# Patient Record
Sex: FEMALE | Race: WHITE | NOT HISPANIC OR LATINO | Employment: UNEMPLOYED | URBAN - NONMETROPOLITAN AREA
[De-identification: names, ages, dates, MRNs, and addresses within clinical notes are randomized per-mention and may not be internally consistent; named-entity substitution may affect disease eponyms.]

---

## 2016-02-02 LAB — HM PAP SMEAR: NORMAL

## 2017-03-16 ENCOUNTER — OFFICE VISIT (OUTPATIENT)
Dept: OBSTETRICS AND GYNECOLOGY | Facility: CLINIC | Age: 37
End: 2017-03-16

## 2017-03-16 VITALS
DIASTOLIC BLOOD PRESSURE: 74 MMHG | HEIGHT: 69 IN | WEIGHT: 207 LBS | BODY MASS INDEX: 30.66 KG/M2 | SYSTOLIC BLOOD PRESSURE: 128 MMHG

## 2017-03-16 DIAGNOSIS — N92.0 MENORRHAGIA WITH REGULAR CYCLE: ICD-10-CM

## 2017-03-16 DIAGNOSIS — N84.1 CERVICAL POLYP: ICD-10-CM

## 2017-03-16 DIAGNOSIS — Z01.411 ENCOUNTER FOR GYNECOLOGICAL EXAMINATION WITH ABNORMAL FINDING: Primary | ICD-10-CM

## 2017-03-16 PROCEDURE — 99395 PREV VISIT EST AGE 18-39: CPT | Performed by: OBSTETRICS & GYNECOLOGY

## 2017-03-16 PROCEDURE — 57500 BIOPSY OF CERVIX: CPT | Performed by: OBSTETRICS & GYNECOLOGY

## 2017-03-16 PROCEDURE — 17250 CHEM CAUT OF GRANLTJ TISSUE: CPT | Performed by: OBSTETRICS & GYNECOLOGY

## 2017-03-16 NOTE — PROGRESS NOTES
Subjective  Chief Complaint   Patient presents with   • Gynecologic Exam     Patient is 36 y.o.  here for annual exam.  Pt with complaints of worsening of menses over the last year.  Pt reports menses are regular but heavy and painful.  Pt reports lasting 6-7 days.  Pt reports change within the last year.  Pt initially had postcoital bleeding as well but not at present.  Pt using natural family planning for birth control.  Pt told many years ago could not use hormonal contraception secondary to seizures which were related from head trauma.  Pt previously saw Dr. Epps.  Pt does not have neurologist now.  Pt interested in other forms of birth control.    History  Past Medical History   Diagnosis Date   • Abnormal Pap smear of cervix      ATYPICAL, REPEAT NORMAL, NO FURTHER TREATMENT REQUIRED   • Broken arm    • Broken foot    • Headache      Migraines   • Kidney infection 2016   • Ovarian cyst    • Seizures      last seizure approx      Current Outpatient Prescriptions on File Prior to Visit   Medication Sig Dispense Refill   • [DISCONTINUED] levoFLOXacin (LEVAQUIN) 500 MG tablet        No current facility-administered medications on file prior to visit.      Allergies   Allergen Reactions   • Vancomycin      Past Surgical History   Procedure Laterality Date   • Bartholin gland cyst excision  2016   • Other surgical history       RIGHT FOOT BROKEN    • Other surgical history       RIGHT ARM BROKEN     Family History   Problem Relation Age of Onset   • Cancer Mother      skin   • Hypertension Father    • Hyperlipidemia Father    • Migraines Father    • Stroke Father      Social History     Social History   • Marital status:      Spouse name: N/A   • Number of children: N/A   • Years of education: N/A     Social History Main Topics   • Smoking status: Former Smoker     Types: Cigarettes   • Smokeless tobacco: Never Used   • Alcohol use Yes   • Drug use: No   • Sexual activity: Yes      "Partners: Male     Birth control/ protection: None     Other Topics Concern   • None     Social History Narrative     Review of Systems  The following systems were reviewed and negative;  constitution, eyes, ENT, respiratory, cardiovascular, gastrointestinal, genitourinary, integument, breast, hematologic / lymphatic, musculoskeletal, neurological, behavioral/psych, endocrine and allergies / immunologic    Objective  Vitals:    03/16/17 1101   BP: 128/74   Weight: 207 lb (93.9 kg)   Height: 69\" (175.3 cm)     Physical Exam:  General Appearance: alert, appears stated age and cooperative  Head: normocephalic, without obvious abnormality and atraumatic  Eyes: lids and lashes normal, conjunctivae and sclerae normal, no icterus, no pallor and corneas clear  Ears: ears appear intact with no abnormalities noted  Neck: suppple, trachea midline and no thyromegaly  Lungs: clear to auscultation, respirations regular, respirations even and respirations unlabored  Heart: regular rhythm and normal rate, normal S1, S2, no murmur, gallop, or rubs and no click  Breasts: Examined in supine position  Symmetric without masses or skin dimpling  Nipples normal without inversion, lesions or discharge  There are no palpable axillary nodes  Abdomen: normal bowel sounds, no masses, no hepatomegaly, no splenomegaly, soft non-tender, no guarding and no rebound tenderness  Pelvic: Clinical staff was present for exam  External genitalia:  normal appearance of the external genitalia including Bartholin's and Lemay's glands.  :  urethral meatus normal; urethral hypermobility is absent.  Vaginal:  normal pink mucosa without prolapse or lesions.  Cervix:  large approx 3-4 cm long polypoid mass posterior aspect of cervix 6 o'clock position; mass removed with polyp forcep; base cauterized with silver nitrate and monsels solution; tampon placed as well; pt tolerated proceedure well  Uterus:  normal size, shape and consistency.  Adnexa:  normal " bimanual exam of the adnexa.  Extremities: moves extremities well, no edema, no cyanosis and no redness  Skin: no bleeding, bruising or rash and no lesions noted  Psych: normal mood and affect, oriented to person, time and place, thought content organized and appropriate judgment    Lab Review   No data reviewed    Imaging   No data reviewed    Assessment/Plan    Problem List Items Addressed This Visit     None      Visit Diagnoses     Encounter for gynecological examination with abnormal finding    -  Primary  Pap done  Discussed contraceptive options; pt to see neurology and consider IUD; info given in pamphlet form    Menorrhagia with regular cycle        Cervical polyp      Polypectomy performed; instructions and precautions given; pt to call if cont menorrhagia and will schedule TVS for further evaluation          Follow up 1 year for annual exam    This note was electronically signed.  Brenda Ruiz M.D.

## 2017-03-22 ENCOUNTER — TELEPHONE (OUTPATIENT)
Dept: OBSTETRICS AND GYNECOLOGY | Facility: CLINIC | Age: 37
End: 2017-03-22

## 2017-03-22 NOTE — TELEPHONE ENCOUNTER
Media Information    File:               Notice:         Okay to inform pt path shows benign polyp         ----- Message -----            From: Barb El            Sent: 3/22/2017   9:25 AM              To: Brenda Ruiz MD         Subject: Edit                                                           The scan below was edited by Barb El [[id number]] on 3/22/2017 at 9:25 AM; it is attached to the following: Tissue Exam on 3/16/2017.                  Description        PATHOLOGY FROM CERVICAL POLYP         Patient        Torie Rg         Document Type        LABORATORY - SCAN         Scan on 3/22/2017  9:25 AM by Barb El : PATHOLOGY FROM CERVICAL POLYP          per Dr Ruiz.

## 2017-03-28 DIAGNOSIS — Z01.411 ENCOUNTER FOR GYNECOLOGICAL EXAMINATION WITH ABNORMAL FINDING: ICD-10-CM

## 2020-01-25 ENCOUNTER — APPOINTMENT (OUTPATIENT)
Dept: GENERAL RADIOLOGY | Facility: HOSPITAL | Age: 40
End: 2020-01-25

## 2020-01-25 ENCOUNTER — HOSPITAL ENCOUNTER (EMERGENCY)
Facility: HOSPITAL | Age: 40
Discharge: HOME | End: 2020-01-25
Attending: EMERGENCY MEDICINE | Admitting: EMERGENCY MEDICINE

## 2020-01-25 VITALS
WEIGHT: 214.4 LBS | TEMPERATURE: 97.8 F | RESPIRATION RATE: 18 BRPM | HEART RATE: 99 BPM | BODY MASS INDEX: 31.76 KG/M2 | OXYGEN SATURATION: 95 % | DIASTOLIC BLOOD PRESSURE: 84 MMHG | SYSTOLIC BLOOD PRESSURE: 127 MMHG | HEIGHT: 69 IN

## 2020-01-25 DIAGNOSIS — S62.101A CLOSED FRACTURE OF RIGHT WRIST, INITIAL ENCOUNTER: Primary | ICD-10-CM

## 2020-01-25 PROCEDURE — 96376 TX/PRO/DX INJ SAME DRUG ADON: CPT

## 2020-01-25 PROCEDURE — 96374 THER/PROPH/DIAG INJ IV PUSH: CPT

## 2020-01-25 PROCEDURE — 25010000002 ONDANSETRON PER 1 MG: Performed by: NURSE PRACTITIONER

## 2020-01-25 PROCEDURE — 25010000002 MORPHINE PER 10 MG: Performed by: NURSE PRACTITIONER

## 2020-01-25 PROCEDURE — 73110 X-RAY EXAM OF WRIST: CPT

## 2020-01-25 PROCEDURE — 96375 TX/PRO/DX INJ NEW DRUG ADDON: CPT

## 2020-01-25 PROCEDURE — 99285 EMERGENCY DEPT VISIT HI MDM: CPT

## 2020-01-25 RX ORDER — ONDANSETRON 2 MG/ML
4 INJECTION INTRAMUSCULAR; INTRAVENOUS ONCE
Status: COMPLETED | OUTPATIENT
Start: 2020-01-25 | End: 2020-01-25

## 2020-01-25 RX ORDER — ONDANSETRON 4 MG/1
4 TABLET, ORALLY DISINTEGRATING ORAL EVERY 6 HOURS PRN
Qty: 20 TABLET | Refills: 0 | Status: SHIPPED | OUTPATIENT
Start: 2020-01-25 | End: 2020-08-19

## 2020-01-25 RX ORDER — KETAMINE HCL IN NACL, ISO-OSM 100MG/10ML
1 SYRINGE (ML) INJECTION ONCE
Status: COMPLETED | OUTPATIENT
Start: 2020-01-25 | End: 2020-01-25

## 2020-01-25 RX ORDER — HYDROCODONE BITARTRATE AND ACETAMINOPHEN 7.5; 325 MG/1; MG/1
1 TABLET ORAL ONCE
Status: COMPLETED | OUTPATIENT
Start: 2020-01-25 | End: 2020-01-25

## 2020-01-25 RX ORDER — HYDROCODONE BITARTRATE AND ACETAMINOPHEN 5; 325 MG/1; MG/1
1 TABLET ORAL EVERY 6 HOURS PRN
Qty: 12 TABLET | Refills: 0 | Status: SHIPPED | OUTPATIENT
Start: 2020-01-25 | End: 2020-01-27

## 2020-01-25 RX ORDER — MORPHINE SULFATE 4 MG/ML
4 INJECTION, SOLUTION INTRAMUSCULAR; INTRAVENOUS ONCE
Status: COMPLETED | OUTPATIENT
Start: 2020-01-25 | End: 2020-01-25

## 2020-01-25 RX ORDER — SODIUM CHLORIDE 0.9 % (FLUSH) 0.9 %
10 SYRINGE (ML) INJECTION AS NEEDED
Status: DISCONTINUED | OUTPATIENT
Start: 2020-01-25 | End: 2020-01-26 | Stop reason: HOSPADM

## 2020-01-25 RX ADMIN — Medication 75 MG: at 21:43

## 2020-01-25 RX ADMIN — ONDANSETRON 4 MG: 2 INJECTION INTRAMUSCULAR; INTRAVENOUS at 22:56

## 2020-01-25 RX ADMIN — ONDANSETRON 4 MG: 2 INJECTION INTRAMUSCULAR; INTRAVENOUS at 20:20

## 2020-01-25 RX ADMIN — MORPHINE SULFATE 4 MG: 4 INJECTION INTRAVENOUS at 21:39

## 2020-01-25 RX ADMIN — HYDROCODONE BITARTRATE AND ACETAMINOPHEN 1 TABLET: 7.5; 325 TABLET ORAL at 23:02

## 2020-01-25 RX ADMIN — MORPHINE SULFATE 4 MG: 4 INJECTION INTRAVENOUS at 20:21
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
## 2020-01-27 ENCOUNTER — HOSPITAL ENCOUNTER (OUTPATIENT)
Dept: GENERAL RADIOLOGY | Facility: HOSPITAL | Age: 40
Discharge: HOME | End: 2020-01-27
Admitting: ORTHOPAEDIC SURGERY

## 2020-01-27 ENCOUNTER — APPOINTMENT (OUTPATIENT)
Dept: PREADMISSION TESTING | Facility: HOSPITAL | Age: 40
End: 2020-01-27

## 2020-01-27 ENCOUNTER — OFFICE VISIT (OUTPATIENT)
Dept: ORTHOPEDIC SURGERY | Facility: CLINIC | Age: 40
End: 2020-01-27

## 2020-01-27 ENCOUNTER — PREP FOR SURGERY (OUTPATIENT)
Dept: OTHER | Facility: HOSPITAL | Age: 40
End: 2020-01-27

## 2020-01-27 VITALS — WEIGHT: 214 LBS | HEIGHT: 69 IN | BODY MASS INDEX: 31.7 KG/M2 | RESPIRATION RATE: 18 BRPM

## 2020-01-27 VITALS — DIASTOLIC BLOOD PRESSURE: 87 MMHG | SYSTOLIC BLOOD PRESSURE: 133 MMHG

## 2020-01-27 DIAGNOSIS — S52.551A OTHER CLOSED EXTRA-ARTICULAR FRACTURE OF DISTAL END OF RIGHT RADIUS, INITIAL ENCOUNTER: Primary | ICD-10-CM

## 2020-01-27 DIAGNOSIS — Z01.818 PREOP EXAMINATION: ICD-10-CM

## 2020-01-27 DIAGNOSIS — S52.611A CLOSED DISPLACED FRACTURE OF STYLOID PROCESS OF RIGHT ULNA, INITIAL ENCOUNTER: ICD-10-CM

## 2020-01-27 DIAGNOSIS — Z01.818 PREOP EXAMINATION: Primary | ICD-10-CM

## 2020-01-27 LAB
ALBUMIN SERPL-MCNC: 4.2 G/DL (ref 3.5–5.2)
ALBUMIN/GLOB SERPL: 1.5 G/DL
ALP SERPL-CCNC: 85 U/L (ref 39–117)
ALT SERPL W P-5'-P-CCNC: 8 U/L (ref 1–33)
ANION GAP SERPL CALCULATED.3IONS-SCNC: 12 MMOL/L (ref 5–15)
AST SERPL-CCNC: 14 U/L (ref 1–32)
B-HCG UR QL: NEGATIVE
BASOPHILS # BLD AUTO: 0.06 10*3/MM3 (ref 0–0.2)
BASOPHILS NFR BLD AUTO: 0.6 % (ref 0–1.5)
BILIRUB SERPL-MCNC: 0.2 MG/DL (ref 0.2–1.2)
BILIRUB UR QL STRIP: NEGATIVE
BUN BLD-MCNC: 9 MG/DL (ref 6–20)
BUN/CREAT SERPL: 11.7 (ref 7–25)
CALCIUM SPEC-SCNC: 8.7 MG/DL (ref 8.6–10.5)
CHLORIDE SERPL-SCNC: 104 MMOL/L (ref 98–107)
CLARITY UR: CLEAR
CO2 SERPL-SCNC: 25 MMOL/L (ref 22–29)
COLOR UR: YELLOW
CREAT BLD-MCNC: 0.77 MG/DL (ref 0.57–1)
DEPRECATED RDW RBC AUTO: 40.3 FL (ref 37–54)
EOSINOPHIL # BLD AUTO: 0.06 10*3/MM3 (ref 0–0.4)
EOSINOPHIL NFR BLD AUTO: 0.6 % (ref 0.3–6.2)
ERYTHROCYTE [DISTWIDTH] IN BLOOD BY AUTOMATED COUNT: 12.2 % (ref 12.3–15.4)
GFR SERPL CREATININE-BSD FRML MDRD: 83 ML/MIN/1.73
GLOBULIN UR ELPH-MCNC: 2.8 GM/DL
GLUCOSE BLD-MCNC: 97 MG/DL (ref 65–99)
GLUCOSE UR STRIP-MCNC: NEGATIVE MG/DL
HCT VFR BLD AUTO: 38.6 % (ref 34–46.6)
HGB BLD-MCNC: 12.6 G/DL (ref 12–15.9)
HGB UR QL STRIP.AUTO: NEGATIVE
IMM GRANULOCYTES # BLD AUTO: 0.04 10*3/MM3 (ref 0–0.05)
IMM GRANULOCYTES NFR BLD AUTO: 0.4 % (ref 0–0.5)
KETONES UR QL STRIP: NEGATIVE
LEUKOCYTE ESTERASE UR QL STRIP.AUTO: NEGATIVE
LYMPHOCYTES # BLD AUTO: 2.29 10*3/MM3 (ref 0.7–3.1)
LYMPHOCYTES NFR BLD AUTO: 24.4 % (ref 19.6–45.3)
MCH RBC QN AUTO: 28.9 PG (ref 26.6–33)
MCHC RBC AUTO-ENTMCNC: 32.6 G/DL (ref 31.5–35.7)
MCV RBC AUTO: 88.5 FL (ref 79–97)
MONOCYTES # BLD AUTO: 0.34 10*3/MM3 (ref 0.1–0.9)
MONOCYTES NFR BLD AUTO: 3.6 % (ref 5–12)
NEUTROPHILS # BLD AUTO: 6.6 10*3/MM3 (ref 1.7–7)
NEUTROPHILS NFR BLD AUTO: 70.4 % (ref 42.7–76)
NITRITE UR QL STRIP: NEGATIVE
NRBC BLD AUTO-RTO: 0 /100 WBC (ref 0–0.2)
PH UR STRIP.AUTO: <=5 [PH] (ref 5–8)
PLATELET # BLD AUTO: 268 10*3/MM3 (ref 140–450)
PMV BLD AUTO: 10.2 FL (ref 6–12)
POTASSIUM BLD-SCNC: 3.9 MMOL/L (ref 3.5–5.2)
PROT SERPL-MCNC: 7 G/DL (ref 6–8.5)
PROT UR QL STRIP: NEGATIVE
RBC # BLD AUTO: 4.36 10*6/MM3 (ref 3.77–5.28)
SODIUM BLD-SCNC: 141 MMOL/L (ref 136–145)
SP GR UR STRIP: >=1.03 (ref 1–1.03)
UROBILINOGEN UR QL STRIP: NORMAL
WBC NRBC COR # BLD: 9.39 10*3/MM3 (ref 3.4–10.8)

## 2020-01-27 PROCEDURE — 71046 X-RAY EXAM CHEST 2 VIEWS: CPT

## 2020-01-27 PROCEDURE — 36415 COLL VENOUS BLD VENIPUNCTURE: CPT

## 2020-01-27 PROCEDURE — 93005 ELECTROCARDIOGRAM TRACING: CPT

## 2020-01-27 PROCEDURE — 85025 COMPLETE CBC W/AUTO DIFF WBC: CPT | Performed by: ORTHOPAEDIC SURGERY

## 2020-01-27 PROCEDURE — 81025 URINE PREGNANCY TEST: CPT | Performed by: ORTHOPAEDIC SURGERY

## 2020-01-27 PROCEDURE — 99204 OFFICE O/P NEW MOD 45 MIN: CPT | Performed by: ORTHOPAEDIC SURGERY

## 2020-01-27 PROCEDURE — 80053 COMPREHEN METABOLIC PANEL: CPT | Performed by: ORTHOPAEDIC SURGERY

## 2020-01-27 PROCEDURE — 81003 URINALYSIS AUTO W/O SCOPE: CPT | Performed by: ORTHOPAEDIC SURGERY

## 2020-01-27 PROCEDURE — 87081 CULTURE SCREEN ONLY: CPT | Performed by: ORTHOPAEDIC SURGERY

## 2020-01-27 RX ORDER — IBUPROFEN 800 MG/1
800 TABLET ORAL EVERY 6 HOURS PRN
COMMUNITY

## 2020-01-27 RX ORDER — CEFAZOLIN SODIUM 2 G/50ML
2 SOLUTION INTRAVENOUS
Status: CANCELLED | OUTPATIENT
Start: 2020-01-28 | End: 2020-01-29

## 2020-01-27 RX ORDER — B-COMPLEX WITH VITAMIN C
1 TABLET ORAL DAILY
COMMUNITY
End: 2020-08-19

## 2020-01-27 RX ORDER — HYDROCODONE BITARTRATE AND ACETAMINOPHEN 5; 325 MG/1; MG/1
1 TABLET ORAL EVERY 6 HOURS PRN
Qty: 20 TABLET | Refills: 0 | Status: SHIPPED | OUTPATIENT
Start: 2020-01-27 | End: 2020-01-30 | Stop reason: HOSPADM
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
## 2020-01-28 LAB — MRSA SPEC QL CULT: NORMAL

## 2020-01-30 ENCOUNTER — APPOINTMENT (OUTPATIENT)
Dept: GENERAL RADIOLOGY | Facility: HOSPITAL | Age: 40
End: 2020-01-30

## 2020-01-30 ENCOUNTER — HOSPITAL ENCOUNTER (OUTPATIENT)
Facility: HOSPITAL | Age: 40
Setting detail: HOSPITAL OUTPATIENT SURGERY
Discharge: HOME | End: 2020-01-30
Attending: ORTHOPAEDIC SURGERY | Admitting: ORTHOPAEDIC SURGERY

## 2020-01-30 ENCOUNTER — ANESTHESIA (OUTPATIENT)
Dept: PERIOP | Facility: HOSPITAL | Age: 40
End: 2020-01-30

## 2020-01-30 ENCOUNTER — ANESTHESIA EVENT (OUTPATIENT)
Dept: PERIOP | Facility: HOSPITAL | Age: 40
End: 2020-01-30

## 2020-01-30 VITALS
OXYGEN SATURATION: 99 % | RESPIRATION RATE: 18 BRPM | TEMPERATURE: 98.8 F | HEART RATE: 76 BPM | SYSTOLIC BLOOD PRESSURE: 123 MMHG | DIASTOLIC BLOOD PRESSURE: 81 MMHG

## 2020-01-30 DIAGNOSIS — Z01.818 PREOP EXAMINATION: ICD-10-CM

## 2020-01-30 DIAGNOSIS — S52.531A CLOSED COLLES' FRACTURE OF RIGHT RADIUS, INITIAL ENCOUNTER: Primary | ICD-10-CM

## 2020-01-30 PROCEDURE — 25010000003 CEFAZOLIN SODIUM-DEXTROSE 2-3 GM-%(50ML) RECONSTITUTED SOLUTION: Performed by: ORTHOPAEDIC SURGERY

## 2020-01-30 PROCEDURE — 25010000002 PROPOFOL 10 MG/ML EMULSION: Performed by: NURSE ANESTHETIST, CERTIFIED REGISTERED

## 2020-01-30 PROCEDURE — C1713 ANCHOR/SCREW BN/BN,TIS/BN: HCPCS | Performed by: ORTHOPAEDIC SURGERY

## 2020-01-30 PROCEDURE — 25010000002 ONDANSETRON PER 1 MG: Performed by: NURSE ANESTHETIST, CERTIFIED REGISTERED

## 2020-01-30 PROCEDURE — 25606 PERQ SKEL FIXJ DSTL RDL FX: CPT | Performed by: ORTHOPAEDIC SURGERY

## 2020-01-30 PROCEDURE — 76000 FLUOROSCOPY <1 HR PHYS/QHP: CPT

## 2020-01-30 PROCEDURE — 25010000003 CEFAZOLIN PER 500 MG: Performed by: ORTHOPAEDIC SURGERY

## 2020-01-30 PROCEDURE — 25010000002 DEXAMETHASONE PER 1 MG: Performed by: NURSE ANESTHETIST, CERTIFIED REGISTERED

## 2020-01-30 PROCEDURE — 25010000002 DEXAMETHASONE SODIUM PHOSPHATE 10 MG/ML SOLUTION: Performed by: NURSE ANESTHETIST, CERTIFIED REGISTERED

## 2020-01-30 PROCEDURE — 25010000002 MIDAZOLAM PER 1MG: Performed by: NURSE ANESTHETIST, CERTIFIED REGISTERED

## 2020-01-30 DEVICE — IMPLANTABLE DEVICE
Type: IMPLANTABLE DEVICE | Site: WRIST | Status: FUNCTIONAL
Brand: MICROAIRE®

## 2020-01-30 RX ORDER — LIDOCAINE HYDROCHLORIDE 20 MG/ML
INJECTION, SOLUTION INTRAVENOUS AS NEEDED
Status: DISCONTINUED | OUTPATIENT
Start: 2020-01-30 | End: 2020-01-30 | Stop reason: SURG

## 2020-01-30 RX ORDER — MIDAZOLAM HYDROCHLORIDE 2 MG/2ML
INJECTION, SOLUTION INTRAMUSCULAR; INTRAVENOUS AS NEEDED
Status: DISCONTINUED | OUTPATIENT
Start: 2020-01-30 | End: 2020-01-30 | Stop reason: SURG

## 2020-01-30 RX ORDER — MIDAZOLAM HYDROCHLORIDE 2 MG/2ML
INJECTION, SOLUTION INTRAMUSCULAR; INTRAVENOUS
Status: COMPLETED
Start: 2020-01-30 | End: 2020-01-30

## 2020-01-30 RX ORDER — PROPOFOL 10 MG/ML
VIAL (ML) INTRAVENOUS AS NEEDED
Status: DISCONTINUED | OUTPATIENT
Start: 2020-01-30 | End: 2020-01-30 | Stop reason: SURG

## 2020-01-30 RX ORDER — SODIUM CHLORIDE 0.9 % (FLUSH) 0.9 %
10 SYRINGE (ML) INJECTION AS NEEDED
Status: DISCONTINUED | OUTPATIENT
Start: 2020-01-30 | End: 2020-01-30 | Stop reason: HOSPADM

## 2020-01-30 RX ORDER — ONDANSETRON 2 MG/ML
INJECTION INTRAMUSCULAR; INTRAVENOUS AS NEEDED
Status: DISCONTINUED | OUTPATIENT
Start: 2020-01-30 | End: 2020-01-30 | Stop reason: SURG

## 2020-01-30 RX ORDER — SODIUM CHLORIDE, SODIUM LACTATE, POTASSIUM CHLORIDE, CALCIUM CHLORIDE 600; 310; 30; 20 MG/100ML; MG/100ML; MG/100ML; MG/100ML
1000 INJECTION, SOLUTION INTRAVENOUS CONTINUOUS
Status: DISCONTINUED | OUTPATIENT
Start: 2020-01-30 | End: 2020-01-30 | Stop reason: HOSPADM

## 2020-01-30 RX ORDER — CEFAZOLIN SODIUM 2 G/50ML
2 SOLUTION INTRAVENOUS ONCE
Status: COMPLETED | OUTPATIENT
Start: 2020-01-30 | End: 2020-01-30

## 2020-01-30 RX ORDER — DEXAMETHASONE SODIUM PHOSPHATE 10 MG/ML
INJECTION, SOLUTION INTRAMUSCULAR; INTRAVENOUS
Status: COMPLETED
Start: 2020-01-30 | End: 2020-01-30

## 2020-01-30 RX ORDER — LIDOCAINE HYDROCHLORIDE 20 MG/ML
INJECTION, SOLUTION INFILTRATION; PERINEURAL AS NEEDED
Status: DISCONTINUED | OUTPATIENT
Start: 2020-01-30 | End: 2020-01-30 | Stop reason: SURG

## 2020-01-30 RX ORDER — LIDOCAINE HYDROCHLORIDE 20 MG/ML
INJECTION, SOLUTION INFILTRATION; PERINEURAL
Status: COMPLETED
Start: 2020-01-30 | End: 2020-01-30

## 2020-01-30 RX ORDER — HYDROCODONE BITARTRATE AND ACETAMINOPHEN 5; 325 MG/1; MG/1
1 TABLET ORAL EVERY 8 HOURS PRN
Qty: 21 TABLET | Refills: 0 | Status: SHIPPED | OUTPATIENT
Start: 2020-01-30 | End: 2020-03-12

## 2020-01-30 RX ORDER — DEXAMETHASONE SODIUM PHOSPHATE 10 MG/ML
INJECTION, SOLUTION INTRAMUSCULAR; INTRAVENOUS AS NEEDED
Status: DISCONTINUED | OUTPATIENT
Start: 2020-01-30 | End: 2020-01-30 | Stop reason: SURG

## 2020-01-30 RX ORDER — BUPIVACAINE HYDROCHLORIDE 5 MG/ML
INJECTION, SOLUTION EPIDURAL; INTRACAUDAL
Status: COMPLETED
Start: 2020-01-30 | End: 2020-01-30

## 2020-01-30 RX ORDER — DEXAMETHASONE SODIUM PHOSPHATE 4 MG/ML
INJECTION, SOLUTION INTRA-ARTICULAR; INTRALESIONAL; INTRAMUSCULAR; INTRAVENOUS; SOFT TISSUE AS NEEDED
Status: DISCONTINUED | OUTPATIENT
Start: 2020-01-30 | End: 2020-01-30 | Stop reason: SURG

## 2020-01-30 RX ORDER — BUPIVACAINE HYDROCHLORIDE 5 MG/ML
INJECTION, SOLUTION EPIDURAL; INTRACAUDAL AS NEEDED
Status: DISCONTINUED | OUTPATIENT
Start: 2020-01-30 | End: 2020-01-30 | Stop reason: SURG

## 2020-01-30 RX ADMIN — PROPOFOL 200 MG: 10 INJECTION, EMULSION INTRAVENOUS at 15:28

## 2020-01-30 RX ADMIN — LIDOCAINE HYDROCHLORIDE 60 MG: 20 INJECTION, SOLUTION INTRAVENOUS at 15:28

## 2020-01-30 RX ADMIN — ONDANSETRON 4 MG: 2 INJECTION INTRAMUSCULAR; INTRAVENOUS at 15:41

## 2020-01-30 RX ADMIN — CEFAZOLIN SODIUM 2 G: 2 SOLUTION INTRAVENOUS at 15:23

## 2020-01-30 RX ADMIN — FAMOTIDINE 20 MG: 10 INJECTION, SOLUTION INTRAVENOUS at 11:43

## 2020-01-30 RX ADMIN — MIDAZOLAM HYDROCHLORIDE 2 MG: 1 INJECTION, SOLUTION INTRAMUSCULAR; INTRAVENOUS at 13:41

## 2020-01-30 RX ADMIN — SODIUM CHLORIDE, POTASSIUM CHLORIDE, SODIUM LACTATE AND CALCIUM CHLORIDE 1000 ML: 600; 310; 30; 20 INJECTION, SOLUTION INTRAVENOUS at 11:42

## 2020-01-30 RX ADMIN — DEXAMETHASONE SODIUM PHOSPHATE 8 MG: 4 INJECTION, SOLUTION INTRAMUSCULAR; INTRAVENOUS at 15:41

## 2020-01-30 RX ADMIN — BUPIVACAINE HYDROCHLORIDE 20 ML: 5 INJECTION, SOLUTION EPIDURAL; INTRACAUDAL; PERINEURAL at 13:46

## 2020-01-30 RX ADMIN — LIDOCAINE HYDROCHLORIDE 10 ML: 20 INJECTION, SOLUTION INFILTRATION; PERINEURAL at 13:46

## 2020-01-30 RX ADMIN — PROPOFOL 100 MG: 10 INJECTION, EMULSION INTRAVENOUS at 15:29

## 2020-01-30 RX ADMIN — DEXAMETHASONE SODIUM PHOSPHATE 10 MG: 10 INJECTION, SOLUTION INTRAMUSCULAR; INTRAVENOUS at 13:46

## 2020-01-30 NOTE — ANESTHESIA PROCEDURE NOTES
Peripheral Block      Patient reassessed immediately prior to procedure    Start time: 1/30/2020 1:41 PM  Stop time: 1/30/2020 1:46 PM  Reason for block: at surgeon's request and post-op pain management  Performed by  CRNA: Oliverio Ochoa CRNA  Preanesthetic Checklist  Completed: patient identified, site marked, surgical consent, pre-op evaluation, timeout performed, IV checked, risks and benefits discussed and monitors and equipment checked  Prep:  Pt Position: supine  Sterile barriers:cap, gloves, mask and sterile barriers  Prep: ChloraPrep  Patient monitoring: blood pressure monitoring, continuous pulse oximetry and EKG  Procedure  Sedation:yes    Guidance:ultrasound guided  ULTRASOUND INTERPRETATION. Using ultrasound guidance a gauge needle was placed in close proximity to the brachial plexus nerve, at which point, under ultrasound guidance anesthetic was injected in the area of the nerve and spread of the anesthesia was seen on ultrasound in close proximity thereto.  There were no abnormalities seen on ultrasound; a digital image was taken; and the patient tolerated the procedure with no complications. Images:still images obtained    Laterality:right  Block Type:infraclavicular  Injection Technique:single-shot  Needle Type:echogenic  Needle Gauge:21 G  Resistance on Injection: none          Post Assessment  Injection Assessment: negative aspiration for heme, no paresthesia on injection and incremental injection  Patient Tolerance:comfortable throughout block  Complications:no  Additional Notes        ++++++++++++++++++++++++++++++++++     Procedure:               SINGLE SHOT INFRACLAVICULAR                                       Patient analgesia was achieved with IV Sedation( see meds)       The pt was placed in semi-fowlers position with a slight tilt of the thorax contralateral to the insertion site.  The Insertion Site was prepped and draped in sterile fashion.  The skin was anesthetized with  Lidocaine 1% 1ml injection utilizing a 25g needle.  Utilizing ultrasound guidance, a BBraun 20 ga echogenic needle was advanced in-plane.  Major vessels (carotid and Internal Jugular) were visualized as the brachial plexus was approached anterior.  The Lateral and Medial cords were identified.  The needle tip was placed posterior to the subclavian artery and Local anesthesia was injected incrementally every 5 mls with aspiration. Injection pressure was normal or little; there was no intraneural injection, no vascular injection.

## 2020-01-30 NOTE — ANESTHESIA PREPROCEDURE EVALUATION
Anesthesia Evaluation     Patient summary reviewed and Nursing notes reviewed   NPO Solid Status: > 8 hours  NPO Liquid Status: > 8 hours           Airway   Mallampati: I  TM distance: >3 FB  Neck ROM: full  No difficulty expected  Dental - normal exam     Pulmonary - negative pulmonary ROS and normal exam   Cardiovascular - negative cardio ROS and normal exam  Exercise tolerance: excellent (>7 METS)        Neuro/Psych  (+) seizures, headaches,     GI/Hepatic/Renal/Endo    (+) obesity,     (-) no renal disease    Musculoskeletal (-) negative ROS    Abdominal  - normal exam    Bowel sounds: normal.   Substance History - negative use     OB/GYN negative ob/gyn ROS         Other                      Anesthesia Plan    ASA 2     general with block     intravenous induction     Anesthetic plan, all risks, benefits, and alternatives have been provided, discussed and informed consent has been obtained with: patient.    Plan discussed with attending.

## 2020-01-30 NOTE — ANESTHESIA POSTPROCEDURE EVALUATION
Patient: Torie Rg    Procedure Summary     Date:  01/30/20 Room / Location:  [facility] OR  /  [facility]    Anesthesia Start:  1528 Anesthesia Stop:  1618    Procedure:  Right distal radius closed reduction and pinning (Right Wrist) Diagnosis:       Preop examination      (Preop examination [Z01.818])    Surgeon:  Ron Gunn MD Provider:  Michael Melendez CRNA    Anesthesia Type:  general with block ASA Status:  2          Anesthesia Type: general with block    Vitals  Vitals Value Taken Time   /86 1/30/2020  4:14 PM   Temp     Pulse 92 1/30/2020  4:18 PM   Resp     SpO2 98 % 1/30/2020  4:18 PM   Vitals shown include unvalidated device data.        Post Anesthesia Care and Evaluation    Patient location during evaluation: PACU  Patient participation: complete - patient participated  Level of consciousness: awake and alert and sleepy but conscious  Pain score: 2  Pain management: adequate  Airway patency: patent  Anesthetic complications: No anesthetic complications  PONV Status: none  Cardiovascular status: acceptable  Respiratory status: acceptable and face mask  Hydration status: acceptable

## 2020-02-13 ENCOUNTER — OFFICE VISIT (OUTPATIENT)
Dept: ORTHOPEDIC SURGERY | Facility: CLINIC | Age: 40
End: 2020-02-13

## 2020-02-13 VITALS — HEIGHT: 69 IN | BODY MASS INDEX: 31.7 KG/M2 | RESPIRATION RATE: 18 BRPM | WEIGHT: 214 LBS

## 2020-02-13 DIAGNOSIS — S52.531A CLOSED COLLES' FRACTURE OF RIGHT RADIUS, INITIAL ENCOUNTER: ICD-10-CM

## 2020-02-13 DIAGNOSIS — S52.591A OTHER CLOSED FRACTURE OF DISTAL END OF RIGHT RADIUS, INITIAL ENCOUNTER: Primary | ICD-10-CM

## 2020-02-13 DIAGNOSIS — M25.532 LEFT WRIST PAIN: Primary | ICD-10-CM

## 2020-02-13 PROCEDURE — 99024 POSTOP FOLLOW-UP VISIT: CPT | Performed by: PHYSICIAN ASSISTANT

## 2020-02-13 RX ORDER — HYDROCODONE BITARTRATE AND ACETAMINOPHEN 5; 325 MG/1; MG/1
1 TABLET ORAL EVERY 12 HOURS PRN
Qty: 14 TABLET | Refills: 0 | Status: SHIPPED | OUTPATIENT
Start: 2020-02-13 | End: 2020-08-19

## 2020-02-13 NOTE — PROGRESS NOTES
Subjective   Patient ID: Torie Rg is a 39 y.o. right hand dominant female is here today for a post-operative visit.  Post-op of the Right Wrist (Right distal radius closed reduction and pinning 1/30/2020)          CHIEF COMPLAINT:    History of Present Illness      Pain controlled: [] no   [x] yes   Medication refill requested: [x] no   [] yes    Patient compliant with instructions: [] no   [x] yes   Other: Reports good progress since surgery.     Past Medical History:   Diagnosis Date   • Abnormal Pap smear of cervix     ATYPICAL, REPEAT NORMAL, NO FURTHER TREATMENT REQUIRED   • Broken arm    • Broken foot    • Headache     Migraines   • Kidney infection 11/22/2016   • Ovarian cyst     Bilateral cysts    • Seizures (CMS/HCC)     last seizure approx 2007        Past Surgical History:   Procedure Laterality Date   • ARM HARDWARE REMOVAL Right 2000   • BARTHOLIN GLAND CYST EXCISION  04/2016   • ORIF ANKLE FRACTURE  1999   • ORIF ULNA/RADIUS FRACTURES Right 2000   • ORIF WRIST FRACTURE Right 1/30/2020    Procedure: Right distal radius closed reduction and pinning;  Surgeon: Ron Gunn MD;  Location: [facility];  Service: Orthopedics   • WISDOM TOOTH EXTRACTION         Allergies   Allergen Reactions   • Vancomycin Shortness Of Breath       Review of Systems   Constitutional: Negative for diaphoresis, fever and unexpected weight change.   HENT: Negative for dental problem and sore throat.    Eyes: Negative for visual disturbance.   Respiratory: Negative for shortness of breath.    Cardiovascular: Negative for chest pain.   Gastrointestinal: Negative for abdominal pain, constipation, diarrhea, nausea and vomiting.   Genitourinary: Negative for difficulty urinating and frequency.   Musculoskeletal: Positive for arthralgias.   Neurological: Negative for headaches.   Hematological: Does not bruise/bleed easily.     I have reviewed the medical and surgical history, family history, social history,  "medications, and/or allergies, and the review of systems of this report.    Objective   Resp 18   Ht 175.3 cm (69\")   Wt 97.1 kg (214 lb)   LMP 01/20/2020 (Approximate)   BMI 31.60 kg/m²       Signs of infection: [x] no                    [] yes   Drainage: [x] no                    [] yes   Incision: [x] healing well     []healed well   Motor exam intact: [] no                    [x] yes   Neurovascular exam intact: [] no                    [x] yes   Signs of compartment syndrome: [x] no                    [] yes   Signs of DVT: [x] no                    [] yes   Other:      Physical Exam  Ortho Exam    Extremity DVT signs are negative by clinical screen.  Neurologic Exam    Assessment/Plan      Independent Review of Radiographic Studies:    AP and lateral of the right wrist, indication to evaluate status of prosthesis and joint, and compared with prior imaging, shows no acute fracture or dislocation. Good position and alignment of prosthesis with no radiographic signs of loosening.          Medical Decision Making:    Stable neurovascular exam.       Procedures     Torie was seen today for post-op.    Diagnoses and all orders for this visit:    Left wrist pain  -     Cancel: XR Wrist 3+ View Left    Closed Colles' fracture of right radius, initial encounter         Recommendations/Plan:     Sutures Staples or Pins [] Removed today  [] At prior visit  [x] Plan removal later   Physical therapy: []rehab facility  []outpatient referral  [] therapy ongoing   Ultrasound: [x]not ordered         []order given to patient   Labs: [x]not ordered         []order given to patient   Weight Bearing status: []Full []WBAT []PWB [x]NWB []Other     Cast, splint or brace care and assistive device usage instructions given  Weight bearing parameters reviewed     The same postop Ortho-Glass plaster splint was reapplied in the office  Patient is neurovascularly intact pre-and postplacement    Follow up in 4 weeks XOA- plan to remove " pins at that time  Patient is encouraged and agreeable to call or return sooner for any issues or concerns.

## 2020-03-11 DIAGNOSIS — M25.532 LEFT WRIST PAIN: Primary | ICD-10-CM

## 2020-03-12 ENCOUNTER — OFFICE VISIT (OUTPATIENT)
Dept: ORTHOPEDIC SURGERY | Facility: CLINIC | Age: 40
End: 2020-03-12

## 2020-03-12 VITALS — RESPIRATION RATE: 18 BRPM | WEIGHT: 214 LBS | BODY MASS INDEX: 31.7 KG/M2 | HEIGHT: 69 IN

## 2020-03-12 DIAGNOSIS — S52.591A OTHER CLOSED FRACTURE OF DISTAL END OF RIGHT RADIUS, INITIAL ENCOUNTER: Primary | ICD-10-CM

## 2020-03-12 DIAGNOSIS — S52.611A CLOSED DISPLACED FRACTURE OF STYLOID PROCESS OF RIGHT ULNA, INITIAL ENCOUNTER: ICD-10-CM

## 2020-03-12 PROCEDURE — 99024 POSTOP FOLLOW-UP VISIT: CPT | Performed by: PHYSICIAN ASSISTANT

## 2020-03-12 NOTE — PROGRESS NOTES
Subjective   Patient ID: Torie Rg is a 39 y.o. right hand dominant female  Post-op of the Right Wrist (Patient here today for post-op visit s/p right distal radius closed reduction and pinning. She states minimal pain, 4/10 at most. Takes ibuprofen only as needed)         History of Present Illness  Patient is following up with a scheduled appointment in regards to right wrist distal radius closed reduction pinning.  Patient states she has soreness to the wrist with some tingling to the digits of the right hand.                                                   Past Medical History:   Diagnosis Date   • Abnormal Pap smear of cervix     ATYPICAL, REPEAT NORMAL, NO FURTHER TREATMENT REQUIRED   • Broken arm    • Broken foot    • Headache     Migraines   • Kidney infection 11/22/2016   • Ovarian cyst     Bilateral cysts    • Seizures (CMS/HCC)     last seizure approx 2007        Past Surgical History:   Procedure Laterality Date   • ARM HARDWARE REMOVAL Right 2000   • BARTHOLIN GLAND CYST EXCISION  04/2016   • ORIF ANKLE FRACTURE  1999   • ORIF ULNA/RADIUS FRACTURES Right 2000   • ORIF WRIST FRACTURE Right 1/30/2020    Procedure: Right distal radius closed reduction and pinning;  Surgeon: Ron Gunn MD;  Location: [facility];  Service: Orthopedics   • WISDOM TOOTH EXTRACTION         Family History   Problem Relation Age of Onset   • Cancer Mother         skin   • Hypertension Father    • Hyperlipidemia Father    • Migraines Father    • Stroke Father        Social History     Socioeconomic History   • Marital status:      Spouse name: Not on file   • Number of children: Not on file   • Years of education: Not on file   • Highest education level: Not on file   Tobacco Use   • Smoking status: Former Smoker     Packs/day: 0.50     Years: 10.00     Pack years: 5.00     Types: Cigarettes   • Smokeless tobacco: Never Used   • Tobacco comment: Quit >5 years ago    Substance and Sexual Activity   •  "Alcohol use: Yes   • Drug use: No   • Sexual activity: Defer         Current Outpatient Medications:   •  Collagen 500 MG capsule, Take 1 capsule by mouth Daily., Disp: , Rfl:   •  HYDROcodone-acetaminophen (NORCO) 5-325 MG per tablet, Take 1 tablet by mouth Every 12 (Twelve) Hours As Needed (PRN pain)., Disp: 14 tablet, Rfl: 0  •  ibuprofen (ADVIL,MOTRIN) 800 MG tablet, Take 800 mg by mouth Every 6 (Six) Hours As Needed for Mild Pain  or Moderate Pain ., Disp: , Rfl:   •  ondansetron ODT (ZOFRAN-ODT) 4 MG disintegrating tablet, Take 1 tablet by mouth Every 6 (Six) Hours As Needed for Nausea., Disp: 20 tablet, Rfl: 0  •  Zinc 100 MG tablet, Take 1 capsule by mouth Daily., Disp: , Rfl:     Allergies   Allergen Reactions   • Vancomycin Shortness Of Breath       Review of Systems   Constitutional: Negative for fever.   HENT: Negative for dental problem and voice change.    Eyes: Negative for visual disturbance.   Respiratory: Negative for shortness of breath.    Cardiovascular: Negative for chest pain.   Gastrointestinal: Negative for abdominal pain.   Genitourinary: Negative for dysuria.   Musculoskeletal: Positive for arthralgias. Negative for gait problem and joint swelling.   Skin: Negative for rash.   Neurological: Negative for speech difficulty.   Hematological: Does not bruise/bleed easily.   Psychiatric/Behavioral: Negative for confusion.       I have reviewed the medical and surgical history, family history, social history, medications, and/or allergies, and the review of systems of this report.    Objective   Resp 18   Ht 175.3 cm (69\")   Wt 97.1 kg (214 lb)   BMI 31.60 kg/m²    Physical Exam   Constitutional: She is oriented to person, place, and time. She appears well-developed and well-nourished.   HENT:   Head: Normocephalic.   Musculoskeletal:        Right wrist: She exhibits decreased range of motion (stiffness from recent immobilization).        Right hand: She exhibits normal capillary refill, no " deformity and no swelling. Decreased sensation noted. Decreased sensation is present in the medial distribution.   Neurological: She is alert and oriented to person, place, and time.   Psychiatric: She has a normal mood and affect.   Nursing note and vitals reviewed.    Ortho Exam     Neurologic Exam     Mental Status   Oriented to person, place, and time.      The pin on radial side has irritated the skin. There is mild skin breakdown approx 0.5cm X 0.5 cm where the pin had been laying on the skin. This was cleansed an Puracol was placed.     The pins were easily removed. No signs of infection       Assessment/Plan   Independent Review of Radiographic Studies:    AP and lateral of the wrist, indication to evaluate fracture healing, and compared with prior imaging, shows interm fracture healing, callus and or periostitis in continued good position and alignment.      Procedures       Torie was seen today for post-op.    Diagnoses and all orders for this visit:    Other closed fracture of distal end of right radius, initial encounter  -     Ambulatory Referral to Physical Therapy Evaluate and treat, Ortho; Feather weight bearing (until 4-1-20, then WBAT)    Closed displaced fracture of styloid process of right ulna, initial encounter  -     Ambulatory Referral to Physical Therapy Evaluate and treat, Ortho; Feather weight bearing (until 4-1-20, then WBAT)       Discussion of orthopedic goals  Risk, benefits, and merits of treatment alternatives reviewed with the patient and questions answered  Physical therapy referral given  Use brace as instructed  Reduced physical activity as appropriate  Weight bearing parameters reviewed    Recommendations/Plan:  Exercise, medications, injections, other patient advice, and return appointment as noted.  Patient is encouraged to call or return for any issues or concerns.  Follow up in 6 weeks XOA    Patient agreeable to call or return sooner for any concerns.             EMR  Dragon-transcription disclaimer:  This encounter note is an electronic transcription of spoken language to printed text.  Electronic transcription of spoken language may permit erroneous or at times nonsensical words or phrases to be inadvertently transcribed.  Although I have reviewed the note for such errors, some may still exist

## 2020-08-19 ENCOUNTER — RESULTS ENCOUNTER (OUTPATIENT)
Dept: INTERNAL MEDICINE | Facility: CLINIC | Age: 40
End: 2020-08-19

## 2020-08-19 ENCOUNTER — OFFICE VISIT (OUTPATIENT)
Dept: INTERNAL MEDICINE | Facility: CLINIC | Age: 40
End: 2020-08-19

## 2020-08-19 VITALS
RESPIRATION RATE: 16 BRPM | HEIGHT: 69 IN | OXYGEN SATURATION: 100 % | BODY MASS INDEX: 30.07 KG/M2 | SYSTOLIC BLOOD PRESSURE: 145 MMHG | DIASTOLIC BLOOD PRESSURE: 107 MMHG | WEIGHT: 203 LBS | HEART RATE: 118 BPM | TEMPERATURE: 97.3 F

## 2020-08-19 DIAGNOSIS — K52.9 GASTROENTERITIS: ICD-10-CM

## 2020-08-19 DIAGNOSIS — R42 DIZZINESS: ICD-10-CM

## 2020-08-19 DIAGNOSIS — R03.0 ELEVATED BLOOD PRESSURE READING: ICD-10-CM

## 2020-08-19 DIAGNOSIS — R00.0 TACHYCARDIA: ICD-10-CM

## 2020-08-19 DIAGNOSIS — R00.2 HEART PALPITATIONS: Primary | ICD-10-CM

## 2020-08-19 DIAGNOSIS — R19.7 DIARRHEA, UNSPECIFIED TYPE: ICD-10-CM

## 2020-08-19 LAB
BILIRUB BLD-MCNC: ABNORMAL MG/DL
CLARITY, POC: ABNORMAL
COLOR UR: YELLOW
GLUCOSE UR STRIP-MCNC: NEGATIVE MG/DL
KETONES UR QL: NEGATIVE
LEUKOCYTE EST, POC: NEGATIVE
NITRITE UR-MCNC: NEGATIVE MG/ML
PH UR: 6 [PH] (ref 5–8)
PROT UR STRIP-MCNC: NEGATIVE MG/DL
RBC # UR STRIP: NEGATIVE /UL
SP GR UR: 1.03 (ref 1–1.03)
UROBILINOGEN UR QL: NORMAL

## 2020-08-19 PROCEDURE — 99214 OFFICE O/P EST MOD 30 MIN: CPT | Performed by: NURSE PRACTITIONER

## 2020-08-19 PROCEDURE — 93000 ELECTROCARDIOGRAM COMPLETE: CPT | Performed by: NURSE PRACTITIONER

## 2020-08-19 PROCEDURE — 81003 URINALYSIS AUTO W/O SCOPE: CPT | Performed by: NURSE PRACTITIONER

## 2020-08-19 RX ORDER — ONDANSETRON 4 MG/1
4 TABLET, ORALLY DISINTEGRATING ORAL EVERY 8 HOURS PRN
Qty: 20 TABLET | Refills: 1 | Status: SHIPPED | OUTPATIENT
Start: 2020-08-19

## 2020-08-19 RX ORDER — ASPIRIN 81 MG/1
81 TABLET, CHEWABLE ORAL DAILY
COMMUNITY
End: 2020-10-02

## 2020-08-19 NOTE — PROGRESS NOTES
Chief Complaint / Reason:      Chief Complaint   Patient presents with   • Dizziness   • Rapid Heart Rate       Subjective     HPI  Patient presents today with complaints of dizziness and rapid heart rate along with elevated blood pressure she states that she has been checking her blood pressure and it fluctuates and whenever she sits up it goes higher and lies down she feels dizzy and her blood pressure normalizes.  She states last week she had a stomach virus and had a lot of diarrhea and on Sunday her heart rate started increasing and she thought she was going to have to go to the ER it was so bad.  She states that her  works at a factory and works long hours but she denies being exposed to the COVID that she is aware of because she is usually home with the kids.  She denies chest pain but does report heart palpitations and ongoing dizziness she states it is worse with change in position.  Patient does appear to be dehydrated and normally blood pressure is in normal range without any medication.  She does not have a history of hypertension.  She denies any fever or chills.  Denies any blood in urine or stool.  She does state that she still little nauseous especially when she is eating or drinking history taken from: patient    PMH/FH/Social History were reviewed and updated appropriately in the electronic medical record.   Past Medical History:   Diagnosis Date   • Abnormal Pap smear of cervix     ATYPICAL, REPEAT NORMAL, NO FURTHER TREATMENT REQUIRED   • Broken arm    • Broken foot    • Headache     Migraines   • Kidney infection 11/22/2016   • Ovarian cyst     Bilateral cysts    • Seizures (CMS/HCC)     last seizure approx 2007     Past Surgical History:   Procedure Laterality Date   • ARM HARDWARE REMOVAL Right 2000   • BARTHOLIN GLAND CYST EXCISION  04/2016   • ORIF ANKLE FRACTURE  1999   • ORIF ULNA/RADIUS FRACTURES Right 2000   • ORIF WRIST FRACTURE Right 1/30/2020    Procedure: Right distal radius  closed reduction and pinning;  Surgeon: Ron Gunn MD;  Location: [facility];  Service: Orthopedics   • WISDOM TOOTH EXTRACTION       Social History     Socioeconomic History   • Marital status:      Spouse name: Not on file   • Number of children: Not on file   • Years of education: Not on file   • Highest education level: Not on file   Tobacco Use   • Smoking status: Former Smoker     Packs/day: 0.50     Years: 10.00     Pack years: 5.00     Types: Cigarettes   • Smokeless tobacco: Never Used   • Tobacco comment: Quit >5 years ago    Substance and Sexual Activity   • Alcohol use: Yes   • Drug use: No   • Sexual activity: Defer     Family History   Problem Relation Age of Onset   • Cancer Mother         skin   • Hypertension Father    • Hyperlipidemia Father    • Migraines Father    • Stroke Father        Review of Systems:   Review of Systems   Constitutional: Positive for fatigue.   Respiratory: Negative.    Cardiovascular: Positive for palpitations.   Gastrointestinal: Positive for diarrhea and nausea.   Neurological: Positive for dizziness and weakness.   Psychiatric/Behavioral: Positive for sleep disturbance and stress.         All other systems were reviewed and are negative.  Exceptions are noted in the subjective or above.      Objective     Vital Signs  Vitals:    08/19/20 1728   BP: (!) 145/107   Pulse: 118   Resp: 16   Temp: 97.3 °F (36.3 °C)   SpO2: 100%       Body mass index is 29.98 kg/m².    Physical Exam   Constitutional: She is oriented to person, place, and time. She appears well-developed and well-nourished. No distress.   HENT:   Head: Normocephalic and atraumatic.   Right Ear: External ear and ear canal normal. Tympanic membrane is erythematous and bulging.   Left Ear: External ear and ear canal normal. Tympanic membrane is erythematous and bulging.   Nose: Mucosal edema and rhinorrhea (clear nasal secretions) present. No sinus tenderness. Right sinus exhibits no maxillary sinus  tenderness and no frontal sinus tenderness. Left sinus exhibits no maxillary sinus tenderness and no frontal sinus tenderness.   Mouth/Throat: Mucous membranes are dry. Posterior oropharyngeal erythema present.   PND   Eyes: Conjunctivae are normal.   Cardiovascular: Regular rhythm, normal heart sounds and intact distal pulses. Tachycardia present.   Pulmonary/Chest: Effort normal and breath sounds normal. No respiratory distress.   Abdominal: Soft. Bowel sounds are normal. She exhibits no distension and no mass. There is no tenderness. There is no rigidity, no rebound, no guarding, no CVA tenderness, no tenderness at McBurney's point and negative Capone's sign. No hernia.   Lymphadenopathy:        Head (right side): No submental, no submandibular and no tonsillar adenopathy present.        Head (left side): No submental, no submandibular and no tonsillar adenopathy present.     She has no cervical adenopathy.   Neurological: She is alert and oriented to person, place, and time.   Skin: Skin is warm and dry. Capillary refill takes less than 2 seconds. Turgor is decreased. No rash noted.   Psychiatric: She has a normal mood and affect. Her behavior is normal. Judgment and thought content normal.   Nursing note and vitals reviewed.           Results Review:    I reviewed the patient's new clinical results.     ECG 12 Lead  Date/Time: 8/19/2020 5:34 PM  Performed by: Anni Montana APRN  Authorized by: Anni Montana APRN   Comparison: compared with previous ECG from 1/27/2020  Similar to previous ECG  Rhythm: sinus tachycardia  Rate: tachycardic  BPM: 110  Conduction: conduction normal  QRS axis: normal  Other: no other findings    Clinical impression: normal ECG              Medication Review:     Current Outpatient Medications:   •  aspirin 81 MG chewable tablet, Chew 81 mg Daily., Disp: , Rfl:   •  Collagen 500 MG capsule, Take 1 capsule by mouth Daily., Disp: , Rfl:   •  ibuprofen (ADVIL,MOTRIN) 800 MG  tablet, Take 800 mg by mouth Every 6 (Six) Hours As Needed for Mild Pain  or Moderate Pain ., Disp: , Rfl:   •  ondansetron ODT (ZOFRAN-ODT) 4 MG disintegrating tablet, Place 1 tablet on the tongue Every 8 (Eight) Hours As Needed for Nausea or Vomiting., Disp: 20 tablet, Rfl: 1    Assessment/Plan   Torie was seen today for dizziness and rapid heart rate.    Diagnoses and all orders for this visit:    Heart palpitations  -     ECG 12 Lead  -     POCT urinalysis dipstick, automated  Discussed worsening signs and symptoms and discussed EKG results with patient  Tachycardia  -     ECG 12 Lead  -     Cancel: Urine Culture - Urine, Urine, Clean Catch; Future  -     POCT urinalysis dipstick, automated  Discussed worsening signs and symptoms  Diarrhea, unspecified type  -     Cancel: Urine Culture - Urine, Urine, Clean Catch; Future  -     Cancel: POC Urinalysis Dipstick, Automated  -     POCT urinalysis dipstick, automated  -     Urine Culture - Urine, Urine, Clean Catch; Future    Elevated blood pressure reading  -     POCT urinalysis dipstick, automated  Initiate lifestyle modifications.   DASH Diet and exercise.   discussed ways to prevent of long-term complications from high blood pressure.  Discussed when to seek medical attention.  Encouraged patient to take blood pressure daily and keep a log.      Dizziness  -     Cancel: POC Urinalysis Dipstick, Automated  -     POCT urinalysis dipstick, automated  -     Urine Culture - Urine, Urine, Clean Catch; Future  Advised patient to closely monitor blood pressure and use caution when changing position recommend maintaining hydration as she is very high risk for dehydration recommend getting adequate nutrition and hydration.  Gastroenteritis  -     ondansetron ODT (ZOFRAN-ODT) 4 MG disintegrating tablet; Place 1 tablet on the tongue Every 8 (Eight) Hours As Needed for Nausea or Vomiting.    Discussed oral rehydration, reintroduction of solid foods, signs of dehydration.  Let  your stomach settle. Stop eating solid foods for a few hours.  Try sucking on ice chips or taking small sips of water. You might also try drinking clear soda, clear broths or noncaffeinated sports drinks. Drink plenty of liquid every day, taking small, frequent sips.  Ease back into eating. Gradually begin to eat bland, easy-to-digest foods, such as soda crackers, toast, gelatin, bananas, rice and chicken. Stop eating if your nausea returns.  Avoid certain foods and substances until you feel better. These include dairy products, caffeine, and fatty or highly seasoned foods.  Get plenty of rest. The illness and dehydration may have made you weak and tired.  Practice good hand hygiene.        Return if symptoms worsen or fail to improve.    Anni Montana, APRN  08/19/2020

## 2020-10-02 ENCOUNTER — TELEPHONE (OUTPATIENT)
Dept: INTERNAL MEDICINE | Facility: CLINIC | Age: 40
End: 2020-10-02

## 2020-10-02 ENCOUNTER — OFFICE VISIT (OUTPATIENT)
Dept: INTERNAL MEDICINE | Facility: CLINIC | Age: 40
End: 2020-10-02

## 2020-10-02 VITALS
TEMPERATURE: 98 F | RESPIRATION RATE: 18 BRPM | OXYGEN SATURATION: 97 % | SYSTOLIC BLOOD PRESSURE: 125 MMHG | HEART RATE: 86 BPM | WEIGHT: 211.38 LBS | DIASTOLIC BLOOD PRESSURE: 65 MMHG | BODY MASS INDEX: 31.31 KG/M2 | HEIGHT: 69 IN

## 2020-10-02 DIAGNOSIS — Z23 NEED FOR INFLUENZA VACCINATION: ICD-10-CM

## 2020-10-02 DIAGNOSIS — R22.2 MASS ON BACK: Primary | ICD-10-CM

## 2020-10-02 DIAGNOSIS — Z12.31 ENCOUNTER FOR SCREENING MAMMOGRAM FOR BREAST CANCER: ICD-10-CM

## 2020-10-02 PROCEDURE — 99213 OFFICE O/P EST LOW 20 MIN: CPT | Performed by: FAMILY MEDICINE

## 2020-10-02 PROCEDURE — 90686 IIV4 VACC NO PRSV 0.5 ML IM: CPT | Performed by: FAMILY MEDICINE

## 2020-10-02 PROCEDURE — 90471 IMMUNIZATION ADMIN: CPT | Performed by: FAMILY MEDICINE

## 2020-10-02 NOTE — TELEPHONE ENCOUNTER
PATIENT IS REQUESTING THAT HER ULTRASOUND REFERRAL BE SENT TO [facility].    CALLBACK NUMBER IS   [phone]

## 2020-10-02 NOTE — PATIENT INSTRUCTIONS
Lipoma    A lipoma is a noncancerous (benign) tumor that is made up of fat cells. This is a very common type of soft-tissue growth. Lipomas are usually found under the skin (subcutaneous). They may occur in any tissue of the body that contains fat. Common areas for lipomas to appear include the back, shoulders, buttocks, and thighs.   Lipomas grow slowly, and they are usually painless. Most lipomas do not cause problems and do not require treatment.  What are the causes?  The cause of this condition is not known.  What increases the risk?  You are more likely to develop this condition if:  · You are 40-60 years old.  · You have a family history of lipomas.  What are the signs or symptoms?  A lipoma usually appears as a small, round bump under the skin. In most cases, the lump will:  · Feel soft or rubbery.  · Not cause pain or other symptoms.  However, if a lipoma is located in an area where it pushes on nerves, it can become painful or cause other symptoms.  How is this diagnosed?  A lipoma can usually be diagnosed with a physical exam. You may also have tests to confirm the diagnosis and to rule out other conditions. Tests may include:  · Imaging tests, such as a CT scan or MRI.  · Removal of a tissue sample to be looked at under a microscope (biopsy).  How is this treated?  Treatment for this condition depends on the size of the lipoma and whether it is causing any symptoms.  · For small lipomas that are not causing problems, no treatment is needed.  · If a lipoma is bigger or it causes problems, surgery may be done to remove the lipoma. Lipomas can also be removed to improve appearance. Most often, the procedure is done after applying a medicine that numbs the area (local anesthetic).  Follow these instructions at home:  · Watch your lipoma for any changes.  · Keep all follow-up visits as told by your health care provider. This is important.  Contact a health care provider if:  · Your lipoma becomes larger or  hard.  · Your lipoma becomes painful, red, or increasingly swollen. These could be signs of infection or a more serious condition.  Get help right away if:  · You develop tingling or numbness in an area near the lipoma. This could indicate that the lipoma is causing nerve damage.  Summary  · A lipoma is a noncancerous tumor that is made up of fat cells.  · Most lipomas do not cause problems and do not require treatment.  · If a lipoma is bigger or it causes problems, surgery may be done to remove the lipoma.  This information is not intended to replace advice given to you by your health care provider. Make sure you discuss any questions you have with your health care provider.  Document Released: 12/08/2003 Document Revised: 12/04/2018 Document Reviewed: 12/04/2018  [employer] Patient Education © 2020 [employer]      Lipoma Removal  Lipoma removal is a surgical procedure to remove a noncancerous (benign) tumor that is made up of fat cells (lipoma). Most lipomas are small and painless and do not require treatment. They can form in many areas of the body but are most common under the skin of the back, shoulders, arms, and thighs.  You may need lipoma removal if you have a lipoma that is large, growing, or causing discomfort. Lipoma removal may also be done for cosmetic reasons.  Tell a health care provider about:  · Any allergies you have.  · All medicines you are taking, including vitamins, herbs, eye drops, creams, and over-the-counter medicines.  · Any problems you or family members have had with anesthetic medicines.  · Any blood disorders you have.  · Any surgeries you have had.  · Any medical conditions you have.  · Whether you are pregnant or may be pregnant.  What are the risks?  Generally, this is a safe procedure. However, problems may occur, including:  · Infection.  · Bleeding.  · Allergic reactions to medicines.  · Damage to nerves or blood vessels near the lipoma.  · Scarring.  What happens before the  procedure?  Staying hydrated  Follow instructions from your health care provider about hydration, which may include:  · Up to 2 hours before the procedure - you may continue to drink clear liquids, such as water, clear fruit juice, black coffee, and plain tea.  Eating and drinking restrictions  Follow instructions from your health care provider about eating and drinking, which may include:  · 8 hours before the procedure - stop eating heavy meals or foods such as meat, fried foods, or fatty foods.  · 6 hours before the procedure - stop eating light meals or foods, such as toast or cereal.  · 6 hours before the procedure - stop drinking milk or drinks that contain milk.  · 2 hours before the procedure - stop drinking clear liquids.  Medicines  · Ask your health care provider about:  ? Changing or stopping your regular medicines. This is especially important if you are taking diabetes medicines or blood thinners.  ? Taking medicines such as aspirin and ibuprofen. These medicines can thin your blood. Do not take these medicines before your procedure if your health care provider instructs you not to.  · You may be given antibiotic medicine to help prevent infection.  General instructions  · Ask your health care provider how your surgical site will be marked or identified.  · You will have a physical exam. Your health care provider will check the size of the lipoma and whether it can be moved easily.  · You may have imaging tests, such as:  ? X-rays.  ? CT scan.  ? MRI.  · Plan to have someone take you home from the hospital or clinic.  What happens during the procedure?  · To reduce your risk of infection:  ? Your health care team will wash or sanitize their hands.  ? Your skin will be washed with soap.  · You will be given one or more of the following:  ? A medicine to help you relax (sedative).  ? A medicine to numb the area (local anesthetic).  ? A medicine to make you fall asleep (general anesthetic).  ? A medicine  that is injected into an area of your body to numb everything below the injection site (regional anesthetic).  · An incision will be made over the lipoma or very near the lipoma. The incision may be made in a natural skin line or crease.  · Tissues, nerves, and blood vessels near the lipoma will be moved out of the way.  · The lipoma and the capsule that surrounds it will be  from the surrounding tissues.  · The lipoma will be removed.  · The incision may be closed with stitches (sutures).  · A bandage (dressing) will be placed over the incision.  What happens after the procedure?  · Do not drive for 24 hours if you received a sedative.  · Your blood pressure, heart rate, breathing rate, and blood oxygen level will be monitored until the medicines you were given have worn off.  This information is not intended to replace advice given to you by your health care provider. Make sure you discuss any questions you have with your health care provider.  Document Released: 03/02/2017 Document Revised: 08/10/2017 Document Reviewed: 03/02/2017  [employer] Patient Education © 2020 [employer]

## 2020-10-02 NOTE — PROGRESS NOTES
"Subjective    Torie Rg is a 39 y.o. female here for:  Chief Complaint   Patient presents with   • Cyst     Knot on left scapula x1 year. Started out small when she was having trouble with the left shoulder. Since the knot has grown in size. Sometimes hurts.        History per MA reviewed.    Lot of shoulder problems for years  About a year ago a tiny knot popped up  About 3-4 months ago it got larger  Has been hurting in the left shoulder area and breast area         The following portions of the patient's history were reviewed and updated as appropriate: allergies, current medications, past family history, past medical history, past social history, past surgical history and problem list.    Review of Systems   Constitutional: Negative for fever.   Musculoskeletal: Positive for back pain.   Psychiatric/Behavioral: Positive for stress.       Visit Vitals  /65   Pulse 86   Temp 98 °F (36.7 °C) (Temporal)   Resp 18   Ht 175.3 cm (69.02\")   Wt 95.9 kg (211 lb 6 oz)   LMP 09/28/2020   SpO2 97%   BMI 31.20 kg/m²         Objective   Physical Exam  Vitals signs and nursing note reviewed.   Constitutional:       General: She is not in acute distress.     Appearance: Normal appearance. She is well-developed and well-groomed. She is not ill-appearing, toxic-appearing or diaphoretic.      Interventions: Face mask in place.   HENT:      Head: Normocephalic and atraumatic.      Right Ear: Hearing normal.      Left Ear: Hearing normal.   Eyes:      General: Lids are normal. No scleral icterus.     Extraocular Movements: Extraocular movements intact.      Conjunctiva/sclera: Conjunctivae normal.      Pupils: Pupils are equal, round, and reactive to light.   Neck:      Musculoskeletal: Neck supple.      Trachea: Phonation normal.   Pulmonary:      Effort: Pulmonary effort is normal.   Skin:     General: Skin is warm.      Coloration: Skin is not ashen, cyanotic, jaundiced, pale or sallow.          Neurological:     "  General: No focal deficit present.      Mental Status: She is alert and oriented to person, place, and time.      Cranial Nerves: No dysarthria.      Motor: Motor function is intact.      Gait: Gait normal.   Psychiatric:         Attention and Perception: Attention and perception normal.         Mood and Affect: Mood and affect normal.         Speech: Speech normal.         Behavior: Behavior normal. Behavior is cooperative.         Thought Content: Thought content normal.         Cognition and Memory: Cognition and memory normal.         Judgment: Judgment normal.           Assessment/Plan     Problem List Items Addressed This Visit     None      Visit Diagnoses     Mass on back    -  Primary    Relevant Orders    US Soft Tissue    Encounter for screening mammogram for breast cancer        Relevant Orders    Mammo Screening Digital Tomosynthesis Bilateral With CAD    Need for influenza vaccination        Relevant Orders    Fluarix Quad >6 Months ([id number])        · Will refer to general surgery pending imaging  Return for Annual physical, Pap Smear.      Rosalina John MD

## 2020-10-22 ENCOUNTER — TELEPHONE (OUTPATIENT)
Dept: INTERNAL MEDICINE | Facility: CLINIC | Age: 40
End: 2020-10-22

## 2020-10-22 DIAGNOSIS — R22.2 MASS ON BACK: Primary | ICD-10-CM

## 2020-11-02 NOTE — PROGRESS NOTES
Patient: Torie Rg    YOB: 1980    Date: 11/03/2020    Primary Care Provider: Rosalina John MD    Chief Complaint   Patient presents with   • Cyst     upper back       SUBJECTIVE:    History of present illness: Patient states that she has a growth in the left upper back over the scapula.  She states that it has been there for approximately 1 year and has increased in size.  Occasionally causes some associated aching pain.  Nothing specifically makes it better or worse.     The following portions of the patient's history were reviewed and updated as appropriate: allergies, current medications, past family history, past medical history, past social history, past surgical history and problem list.      Review of Systems   Constitutional: Negative for chills, fever and unexpected weight change.   HENT: Negative for hearing loss, trouble swallowing and voice change.    Eyes: Negative for visual disturbance.   Respiratory: Negative for apnea, cough, chest tightness, shortness of breath and wheezing.    Cardiovascular: Negative for chest pain, palpitations and leg swelling.   Gastrointestinal: Negative for abdominal distention, abdominal pain, anal bleeding, blood in stool, constipation, diarrhea, nausea, rectal pain and vomiting.   Endocrine: Negative for cold intolerance and heat intolerance.   Genitourinary: Negative for difficulty urinating, dysuria, flank pain and hematuria.   Musculoskeletal: Negative for back pain, gait problem and joint swelling.   Skin: Negative for color change, rash and wound.   Neurological: Negative for dizziness, syncope, speech difficulty, weakness, light-headedness, numbness and headaches.   Hematological: Negative for adenopathy. Does not bruise/bleed easily.   Psychiatric/Behavioral: Negative for confusion. The patient is not nervous/anxious.        Allergies:  Allergies   Allergen Reactions   • Vancomycin Shortness Of Breath  "      Medications:    Current Outpatient Medications:   •  Collagen 500 MG capsule, Take 1 capsule by mouth Daily., Disp: , Rfl:   •  ibuprofen (ADVIL,MOTRIN) 800 MG tablet, Take 800 mg by mouth Every 6 (Six) Hours As Needed for Mild Pain  or Moderate Pain ., Disp: , Rfl:   •  ondansetron ODT (ZOFRAN-ODT) 4 MG disintegrating tablet, Place 1 tablet on the tongue Every 8 (Eight) Hours As Needed for Nausea or Vomiting., Disp: 20 tablet, Rfl: 1    History:  Past Medical History:   Diagnosis Date   • Abnormal Pap smear of cervix     ATYPICAL, REPEAT NORMAL, NO FURTHER TREATMENT REQUIRED   • Broken arm    • Broken foot    • Headache     Migraines   • Kidney infection 11/22/2016   • Ovarian cyst     Bilateral cysts    • Seizures (CMS/HCC)     last seizure approx 2007       Past Surgical History:   Procedure Laterality Date   • ARM HARDWARE REMOVAL Right 2000   • BARTHOLIN GLAND CYST EXCISION  04/2016   • ORIF ANKLE FRACTURE  1999   • ORIF ULNA/RADIUS FRACTURES Right 2000   • ORIF WRIST FRACTURE Right 1/30/2020    Procedure: Right distal radius closed reduction and pinning;  Surgeon: Ron Gunn MD;  Location: [facility];  Service: Orthopedics   • WISDOM TOOTH EXTRACTION         Family History   Problem Relation Age of Onset   • Cancer Mother         skin   • Hypertension Father    • Hyperlipidemia Father    • Migraines Father    • Stroke Father        Social History     Tobacco Use   • Smoking status: Former Smoker     Packs/day: 0.50     Years: 10.00     Pack years: 5.00     Types: Cigarettes   • Smokeless tobacco: Never Used   • Tobacco comment: Quit >5 years ago    Substance Use Topics   • Alcohol use: Yes   • Drug use: No        OBJECTIVE:    Vital Signs:   Vitals:    11/03/20 1048   BP: 120/76   Pulse: 98   Temp: 98.7 °F (37.1 °C)   SpO2: 99%   Weight: 96.6 kg (213 lb)   Height: 175.3 cm (69.02\")       Physical Exam:   General Appearance:    Alert, cooperative, in no acute distress   Head:    Normocephalic, " without obvious abnormality, atraumatic   Eyes:            Normal.  No scleral icterus.  PERRLA    Lungs:     Clear to auscultation,respirations regular, even and                  unlabored    Heart:    Regular rhythm and normal rate, normal S1 and S2, no            murmur   Abdomen:     Normal bowel sounds, no masses, no organomegaly, soft        non-tender, non-distended, no guarding,    Extremities:   Moves all extremities well, no edema, no cyanosis, no             redness   Skin:  3 to 4 cm left upper back subcutaneous lipoma.   Neurologic:   Normal without gross deficits.   Psychiatric: No evidence of depression or anxiety        Results Review:   None    Review of Systems was reviewed and confirmed as accurate as documented by the MA.    ASSESSMENT/PLAN:    1. Lipoma of torso        I explained the diagnosis to her.  I gave her the options of both observation versus excision.  At this time the patient wishes to proceed with an excision.  She understands the procedure as well as the risk of bleeding and infection and she wants to proceed with excision.        Electronically signed by Daniel Mcclelland MD  11/03/20

## 2020-11-03 ENCOUNTER — OFFICE VISIT (OUTPATIENT)
Dept: SURGERY | Facility: CLINIC | Age: 40
End: 2020-11-03

## 2020-11-03 VITALS
HEART RATE: 98 BPM | SYSTOLIC BLOOD PRESSURE: 120 MMHG | HEIGHT: 69 IN | TEMPERATURE: 98.7 F | BODY MASS INDEX: 31.55 KG/M2 | WEIGHT: 213 LBS | DIASTOLIC BLOOD PRESSURE: 76 MMHG | OXYGEN SATURATION: 99 %

## 2020-11-03 DIAGNOSIS — D17.1 LIPOMA OF TORSO: Primary | ICD-10-CM

## 2020-11-03 PROCEDURE — 99203 OFFICE O/P NEW LOW 30 MIN: CPT | Performed by: SURGERY

## 2020-11-10 NOTE — PROGRESS NOTES
Location:  left upper back    Procedure:  excision lipoma of the back    I recommend excision. Procedure and the risks and benefits were explained including bleeding and infection. The patient understands these and wishes to proceed.     The patient was brought to the procedure room. Consent and time out were performed. The area was prepped and draped in the usual fashion. 1% lidocaine with epinephrine was infused locally. A transverse incision was made overlying the lipoma in the left upper back. Full thickness excision was performed.  The lipoma was polylobulated and removal was somewhat more difficult.  The lesion size was 4 cm. The wound was closed in layers with interrupted simple vicryl and Nylon for the skin.  There were no complications and the patient tolerated the procedure well. Hemostasis was well controlled with pressure and there was minimal blood loss. Wound instructions were given.  Patient will follow-up with me if she has any issues.

## 2020-11-13 ENCOUNTER — PROCEDURE VISIT (OUTPATIENT)
Dept: SURGERY | Facility: CLINIC | Age: 40
End: 2020-11-13

## 2020-11-13 VITALS
BODY MASS INDEX: 31.55 KG/M2 | HEART RATE: 90 BPM | SYSTOLIC BLOOD PRESSURE: 140 MMHG | DIASTOLIC BLOOD PRESSURE: 90 MMHG | WEIGHT: 213 LBS | HEIGHT: 69 IN | OXYGEN SATURATION: 98 %

## 2020-11-13 DIAGNOSIS — D17.1 LIPOMA OF TORSO: Primary | ICD-10-CM

## 2020-11-13 PROCEDURE — 21931 EXC BACK LES SC 3 CM/>: CPT | Performed by: SURGERY

## 2020-12-07 ENCOUNTER — HOSPITAL ENCOUNTER (OUTPATIENT)
Dept: MAMMOGRAPHY | Facility: HOSPITAL | Age: 40
Discharge: HOME | End: 2020-12-07
Admitting: FAMILY MEDICINE

## 2020-12-07 PROCEDURE — 77067 SCR MAMMO BI INCL CAD: CPT

## 2020-12-07 PROCEDURE — 77063 BREAST TOMOSYNTHESIS BI: CPT

## 2023-03-03 ENCOUNTER — TRANSCRIBE ORDERS (OUTPATIENT)
Dept: ADMINISTRATIVE | Facility: HOSPITAL | Age: 43
End: 2023-03-03
Payer: COMMERCIAL

## 2023-03-03 DIAGNOSIS — Z12.31 VISIT FOR SCREENING MAMMOGRAM: Primary | ICD-10-CM

## 2023-06-15 ENCOUNTER — HOSPITAL ENCOUNTER (OUTPATIENT)
Dept: MAMMOGRAPHY | Facility: HOSPITAL | Age: 43
Discharge: HOME | End: 2023-06-15
Admitting: FAMILY MEDICINE
Payer: COMMERCIAL

## 2023-06-15 DIAGNOSIS — Z12.31 VISIT FOR SCREENING MAMMOGRAM: ICD-10-CM

## 2023-06-15 PROCEDURE — 77063 BREAST TOMOSYNTHESIS BI: CPT

## 2023-06-15 PROCEDURE — 77067 SCR MAMMO BI INCL CAD: CPT

## 2023-07-24 NOTE — PROGRESS NOTES
"Patient: Torie Rg  YOB: 1980    Date: 07/25/2023    Primary Care Provider: Rosalina John MD    Chief Complaint   Patient presents with    Lipoma of torso       History: Patient states that she feels like she has recurrent lipoma in the previous excision site in the left upper back.  She states that it causes her pain.    The following portions of the patient's history were reviewed and updated as appropriate: allergies, current medications, past family history, past medical history, past social history, past surgical history and problem list.    Review of Systems   Constitutional:  Negative for activity change, chills, fever and unexpected weight change.   HENT:  Negative for hearing loss, trouble swallowing and voice change.    Eyes:  Negative for visual disturbance.   Respiratory:  Negative for apnea, cough, chest tightness, shortness of breath and wheezing.    Cardiovascular:  Negative for chest pain, palpitations and leg swelling.   Gastrointestinal:  Negative for abdominal distention, abdominal pain, anal bleeding, blood in stool, constipation, diarrhea, nausea, rectal pain and vomiting.   Endocrine: Negative for cold intolerance and heat intolerance.   Genitourinary:  Negative for difficulty urinating, dysuria and flank pain.   Musculoskeletal:  Negative for back pain and gait problem.   Skin:  Negative for color change, rash and wound.   Neurological:  Positive for headaches. Negative for dizziness, syncope, speech difficulty, weakness, light-headedness and numbness.   Hematological:  Negative for adenopathy. Does not bruise/bleed easily.   Psychiatric/Behavioral:  Negative for confusion. The patient is not nervous/anxious.      Vital Signs  Vitals:    07/25/23 0904   BP: 134/88   BP Location: Left arm   Patient Position: Sitting   Cuff Size: Adult   Pulse: 84   Temp: 98.2 °F (36.8 °C)   SpO2: 99%   Weight: 97.2 kg (214 lb 3.2 oz)   Height: 175.3 cm (69\") "       Allergies:  Allergies   Allergen Reactions    Vancomycin Shortness Of Breath       Medications:    Current Outpatient Medications:     acetaminophen (TYLENOL) 325 MG tablet, Take 1 tablet by mouth Every 6 (Six) Hours As Needed., Disp: , Rfl:     ibuprofen (ADVIL,MOTRIN) 200 MG tablet, Take 1 tablet by mouth Every 6 (Six) Hours As Needed., Disp: , Rfl:     Physical Exam:   General Appearance:    Alert, cooperative, in no acute distress   Head:    Normocephalic, without obvious abnormality, atraumatic   Eyes:            Normal.  No scleral icterus.  PERRLA    Lungs:     Clear to auscultation,respirations regular, even and                  unlabored    Heart:    Regular rhythm and normal rate, normal S1 and S2, no            murmur  Skin: 4 cm lipoma in the left upper back.     Results Review:   None     Review of Systems was reviewed and confirmed as accurate as documented by the MA.    ASSESSMENT/PLAN:    1. Subcutaneous lipoma       I explained her the procedure as well as the risk of bleeding and infection as well as recurrence.  May also not resolve the pain.  She wishes to proceed.  We will make arrangements for this.    Electronically signed by Daniel Mcclelland MD  07/25/23

## 2023-07-25 ENCOUNTER — OFFICE VISIT (OUTPATIENT)
Dept: SURGERY | Facility: CLINIC | Age: 43
End: 2023-07-25
Payer: COMMERCIAL

## 2023-07-25 VITALS
SYSTOLIC BLOOD PRESSURE: 134 MMHG | DIASTOLIC BLOOD PRESSURE: 88 MMHG | HEIGHT: 69 IN | WEIGHT: 214.2 LBS | BODY MASS INDEX: 31.73 KG/M2 | TEMPERATURE: 98.2 F | HEART RATE: 84 BPM | OXYGEN SATURATION: 99 %

## 2023-07-25 DIAGNOSIS — D17.30 SUBCUTANEOUS LIPOMA: Primary | ICD-10-CM

## 2023-07-25 PROCEDURE — 1160F RVW MEDS BY RX/DR IN RCRD: CPT | Performed by: SURGERY

## 2023-07-25 PROCEDURE — 99213 OFFICE O/P EST LOW 20 MIN: CPT | Performed by: SURGERY

## 2023-07-25 PROCEDURE — 1159F MED LIST DOCD IN RCRD: CPT | Performed by: SURGERY

## 2023-07-25 RX ORDER — ACETAMINOPHEN 325 MG/1
325 TABLET ORAL EVERY 6 HOURS PRN
COMMUNITY

## 2023-07-25 RX ORDER — IBUPROFEN 200 MG
200 TABLET ORAL EVERY 6 HOURS PRN
COMMUNITY

## 2023-07-28 RX ORDER — ERGOCALCIFEROL 1.25 MG/1
50000 CAPSULE ORAL WEEKLY
Qty: 12 CAPSULE | Refills: 0 | Status: SHIPPED | OUTPATIENT
Start: 2023-07-28

## 2023-07-28 RX ORDER — CYANOCOBALAMIN 1000 UG/ML
INJECTION, SOLUTION INTRAMUSCULAR; SUBCUTANEOUS
Qty: 10 ML | Refills: 0 | Status: SHIPPED | OUTPATIENT
Start: 2023-07-28

## 2023-08-07 NOTE — PROGRESS NOTES
Location:left upper back subcutaneous lipoma    Procedure:excision 5 cm subcutaneous lipoma      I recommend excision. Procedure and the risks and benefits were explained including bleeding and infection. The patient understands these and wishes to proceed.     The patient was brought to the procedure room. Consent and time out were performed. The area was prepped and draped in the usual fashion. 1% lidocaine with epinephrine was infused locally.  A transverse incision was made over the lipoma through previous incision site.  Full thickness excision was performed of the subcutaneous lipoma. The lesion size was 5 cm. The wound was closed in layers with interrupted simple vicryl and PDS for the skin. There were no complications and the patient tolerated the procedure well. Hemostasis was well controlled with pressure and there was minimal blood loss. Wound instructions were given.

## 2023-08-08 ENCOUNTER — PROCEDURE VISIT (OUTPATIENT)
Dept: SURGERY | Facility: CLINIC | Age: 43
End: 2023-08-08
Payer: COMMERCIAL

## 2023-08-08 VITALS — HEIGHT: 69 IN | HEART RATE: 79 BPM | OXYGEN SATURATION: 98 % | BODY MASS INDEX: 31.7 KG/M2 | WEIGHT: 214 LBS

## 2023-08-08 DIAGNOSIS — D17.1 LIPOMA OF TORSO: Primary | ICD-10-CM

## 2023-08-16 ENCOUNTER — CLINICAL SUPPORT (OUTPATIENT)
Dept: SURGERY | Facility: CLINIC | Age: 43
End: 2023-08-16
Payer: COMMERCIAL

## 2023-08-16 DIAGNOSIS — Z48.02 VISIT FOR SUTURE REMOVAL: Primary | ICD-10-CM

## 2023-08-16 NOTE — PROGRESS NOTES
Patient comes in today to have suture removal from a recent excision of shoulder. The sutures were removed and there was no redness or drainage from the sight. The patient has no complaints.

## 2024-02-09 RX ORDER — CYANOCOBALAMIN 1000 UG/ML
INJECTION, SOLUTION INTRAMUSCULAR; SUBCUTANEOUS
Qty: 6 ML | OUTPATIENT
Start: 2024-02-09

## 2024-02-13 ENCOUNTER — PREP FOR SURGERY (OUTPATIENT)
Dept: OTHER | Facility: HOSPITAL | Age: 44
End: 2024-02-13
Payer: COMMERCIAL

## 2024-02-13 ENCOUNTER — OFFICE VISIT (OUTPATIENT)
Dept: OBSTETRICS AND GYNECOLOGY | Facility: CLINIC | Age: 44
End: 2024-02-13
Payer: COMMERCIAL

## 2024-02-13 VITALS
HEIGHT: 69 IN | DIASTOLIC BLOOD PRESSURE: 72 MMHG | WEIGHT: 196 LBS | BODY MASS INDEX: 29.03 KG/M2 | SYSTOLIC BLOOD PRESSURE: 124 MMHG

## 2024-02-13 DIAGNOSIS — R93.5 ABNORMAL ULTRASOUND OF ENDOMETRIUM: Primary | ICD-10-CM

## 2024-02-13 DIAGNOSIS — R93.5 ABNORMAL ULTRASOUND OF ENDOMETRIUM: ICD-10-CM

## 2024-02-13 DIAGNOSIS — Z12.31 ENCOUNTER FOR SCREENING MAMMOGRAM FOR BREAST CANCER: ICD-10-CM

## 2024-02-13 DIAGNOSIS — N93.0 POSTCOITAL BLEEDING: ICD-10-CM

## 2024-02-13 DIAGNOSIS — N92.1 MENORRHAGIA WITH IRREGULAR CYCLE: Primary | ICD-10-CM

## 2024-02-13 RX ORDER — SODIUM CHLORIDE 9 MG/ML
40 INJECTION, SOLUTION INTRAVENOUS AS NEEDED
Status: CANCELLED | OUTPATIENT
Start: 2024-02-13

## 2024-02-13 RX ORDER — SODIUM CHLORIDE 0.9 % (FLUSH) 0.9 %
10 SYRINGE (ML) INJECTION AS NEEDED
Status: CANCELLED | OUTPATIENT
Start: 2024-02-13

## 2024-02-13 RX ORDER — SODIUM CHLORIDE 0.9 % (FLUSH) 0.9 %
3 SYRINGE (ML) INJECTION EVERY 12 HOURS SCHEDULED
Status: CANCELLED | OUTPATIENT
Start: 2024-02-13

## 2024-02-14 ENCOUNTER — OFFICE VISIT (OUTPATIENT)
Dept: INTERNAL MEDICINE | Facility: CLINIC | Age: 44
End: 2024-02-14
Payer: COMMERCIAL

## 2024-02-14 VITALS
BODY MASS INDEX: 29.18 KG/M2 | HEART RATE: 102 BPM | SYSTOLIC BLOOD PRESSURE: 120 MMHG | TEMPERATURE: 98.2 F | DIASTOLIC BLOOD PRESSURE: 72 MMHG | HEIGHT: 69 IN | OXYGEN SATURATION: 100 % | WEIGHT: 197 LBS

## 2024-02-14 DIAGNOSIS — M62.838 MUSCLE SPASM: ICD-10-CM

## 2024-02-14 DIAGNOSIS — M25.512 ACUTE PAIN OF LEFT SHOULDER: Primary | ICD-10-CM

## 2024-02-14 LAB
BASOPHILS # BLD AUTO: 0.05 10*3/MM3 (ref 0–0.2)
BASOPHILS NFR BLD AUTO: 0.7 % (ref 0–1.5)
EOSINOPHIL # BLD AUTO: 0.06 10*3/MM3 (ref 0–0.4)
EOSINOPHIL NFR BLD AUTO: 0.8 % (ref 0.3–6.2)
ERYTHROCYTE [DISTWIDTH] IN BLOOD BY AUTOMATED COUNT: 12.7 % (ref 12.3–15.4)
ESTRADIOL SERPL-MCNC: 237 PG/ML
FSH SERPL-ACNC: 15.8 MIU/ML
HCT VFR BLD AUTO: 39.8 % (ref 34–46.6)
HGB BLD-MCNC: 13.4 G/DL (ref 12–15.9)
IMM GRANULOCYTES # BLD AUTO: 0.02 10*3/MM3 (ref 0–0.05)
IMM GRANULOCYTES NFR BLD AUTO: 0.3 % (ref 0–0.5)
LYMPHOCYTES # BLD AUTO: 2.06 10*3/MM3 (ref 0.7–3.1)
LYMPHOCYTES NFR BLD AUTO: 28.1 % (ref 19.6–45.3)
MCH RBC QN AUTO: 28.6 PG (ref 26.6–33)
MCHC RBC AUTO-ENTMCNC: 33.7 G/DL (ref 31.5–35.7)
MCV RBC AUTO: 84.9 FL (ref 79–97)
MONOCYTES # BLD AUTO: 0.33 10*3/MM3 (ref 0.1–0.9)
MONOCYTES NFR BLD AUTO: 4.5 % (ref 5–12)
NEUTROPHILS # BLD AUTO: 4.8 10*3/MM3 (ref 1.7–7)
NEUTROPHILS NFR BLD AUTO: 65.6 % (ref 42.7–76)
NRBC BLD AUTO-RTO: 0 /100 WBC (ref 0–0.2)
PLATELET # BLD AUTO: 282 10*3/MM3 (ref 140–450)
RBC # BLD AUTO: 4.69 10*6/MM3 (ref 3.77–5.28)
T4 FREE SERPL-MCNC: 1.35 NG/DL (ref 0.93–1.7)
TESTOST FREE SERPL-MCNC: 1.1 PG/ML (ref 0–4.2)
TESTOST SERPL-MCNC: 15 NG/DL (ref 4–50)
TSH SERPL DL<=0.005 MIU/L-ACNC: 1.41 UIU/ML (ref 0.27–4.2)
WBC # BLD AUTO: 7.32 10*3/MM3 (ref 3.4–10.8)

## 2024-02-14 PROCEDURE — 99213 OFFICE O/P EST LOW 20 MIN: CPT | Performed by: NURSE PRACTITIONER

## 2024-02-14 RX ORDER — METHOCARBAMOL 500 MG/1
500 TABLET, FILM COATED ORAL 2 TIMES DAILY PRN
Qty: 30 TABLET | Refills: 0 | Status: SHIPPED | OUTPATIENT
Start: 2024-02-14

## 2024-02-14 RX ORDER — PREDNISONE 20 MG/1
20 TABLET ORAL DAILY
Qty: 7 TABLET | Refills: 0 | Status: SHIPPED | OUTPATIENT
Start: 2024-02-14 | End: 2024-02-21

## 2024-02-14 NOTE — PROGRESS NOTES
Office Visit      Patient Name: Torie Rg  : 1980   MRN: [id number]     Chief Complaint:    Chief Complaint   Patient presents with    Muscle Pain     Pulled muscle    Numbness     Left side Hand and arm       History of Present Illness: Torie Rg is a 43 y.o. female (previously unknown to me) who is here today for follow-up after being seen at [facility] on 2024 with sudden onset of burning and stabbing pain of left arm and left shoulder that had been present for a week. Had been to [employer] and walked long distances. She had taken naproxen, Tylenol, muscle relaxer without much relief.  Neck was also hurting with tingling in second, third and fourth left fingers.  Found to have muscle spasms on PE.  Given Toradol and methylprednisone IM, prescribed diclofenac gel, meloxicam, methocarbamol and prednisone taper. Pain has eased.  Continues to have left arm & hand numbness; numbness; worse in 2nd, 3rd and 4th fingers. Right hand dominant.  No injury to left arm, neck, left hand.  No swelling, bruising, rash of neck, arm or hand. Admits carrying/pulling on luggage and handbag with left arm during vacation.        Subjective      I have reviewed and the following portions of the patient's history were updated as appropriate: past family history, past medical history, past social history, past surgical history and problem list.      Current Outpatient Medications:     Diclofenac Sodium (VOLTAREN) 1 % gel gel, Apply 4 g topically to the appropriate area as directed 4 (Four) Times a Day As Needed (pain)., Disp: 100 g, Rfl: 0    ibuprofen (ADVIL,MOTRIN) 600 MG tablet, Take 1 tablet by mouth Every 6 (Six) Hours As Needed for Mild Pain., Disp: 60 tablet, Rfl: 0    methocarbamol (ROBAXIN) 500 MG tablet, Take 1 tablet by mouth 2 (Two) Times a Day As Needed for Muscle Spasms., Disp: 30 tablet, Rfl: 0    Allergies   Allergen Reactions    Vancomycin Shortness Of Breath       Objective  "    Physical Exam:  Vital Signs:   Vitals:    02/14/24 1258   BP: 120/72   Pulse: 102   Temp: 98.2 °F (36.8 °C)   SpO2: 100%   Weight: 89.4 kg (197 lb)   Height: 175.3 cm (69.02\")     Body mass index is 29.08 kg/m².         Physical Exam  Constitutional:       Appearance: She is not ill-appearing.   HENT:      Head: Normocephalic.      Right Ear: External ear normal.      Left Ear: External ear normal.   Eyes:      Conjunctiva/sclera: Conjunctivae normal.      Pupils: Pupils are equal, round, and reactive to light.   Cardiovascular:      Rate and Rhythm: Normal rate and regular rhythm.      Pulses:           Radial pulses are 2+ on the right side and 2+ on the left side.        Dorsalis pedis pulses are 2+ on the right side and 2+ on the left side.      Heart sounds: Normal heart sounds.   Pulmonary:      Effort: Pulmonary effort is normal.      Breath sounds: Normal breath sounds.   Musculoskeletal:      Left shoulder: Normal.      Left hand: Normal range of motion. Normal strength. Normal capillary refill.      Cervical back: Normal range of motion and neck supple. Spasms present. Normal range of motion.      Right lower leg: No edema.      Left lower leg: No edema.   Skin:     General: Skin is warm.      Capillary Refill: Capillary refill takes less than 2 seconds.   Neurological:      Mental Status: She is alert and oriented to person, place, and time.      Coordination: Coordination normal.      Gait: Gait normal.   Psychiatric:         Mood and Affect: Mood normal.         Behavior: Behavior normal.         Thought Content: Thought content normal.             Assessment / Plan      Assessment/Plan:   Diagnoses and all orders for this visit:    1. Acute pain of left shoulder (Primary)  -     predniSONE (DELTASONE) 20 MG tablet; Take 1 tablet by mouth Daily for 7 days.  Dispense: 7 tablet; Refill: 0.  To be taken with food.  Will not take NSAIDs while taking prednisone.    -     Ambulatory Referral to Physical " Therapy Evaluate and treat    2. Muscle spasm  -     methocarbamol (ROBAXIN) 500 MG tablet; Take 1 tablet by mouth 2 (Two) Times a Day As Needed for Muscle Spasms.  Dispense: 30 tablet; Refill: 0  - May use heating pad to neck and shoulder for 20 minutes every 4 hours with barrier between heat and skin.       Follow Up:   Return if symptoms worsen or fail to improve.    Patient was given instructions and counseling regarding her condition or for health maintenance advice. Please see specific information pulled into the AVS if appropriate.       [facility] [address] [facility]     Please note that portions of this note may have been completed with a voice recognition program. Efforts were made to edit dictation, but occasionally words are mistranscribed.

## 2024-02-14 NOTE — PRE-PROCEDURE INSTRUCTIONS
PAT phone history completed with patient for upcoming procedure on 2/19/24.    PAT PASS reviewed with patient and he/she verbalized understanding of the following:     Do not eat or drink anything after midnight the night before procedure unless otherwise instructed by physician/surgeon's office, this includes no gum, candy, mints, tobacco products or e-cigarettes.  Do not shave the area to be operated on at least 48 hours prior to procedure.  Do not wear makeup, lotion, hair products, or nail polish.  Do not wear any jewelry and remove all piercings.  Do not wear any adhesive if you wear dentures.  Do not wear contacts; bring in glasses if needed.  Only take medications on the morning of procedure as instructed by PAT nurse per anesthesia guidelines or as instructed by physician's office.   If you are on any blood thinners reach out to the physician/surgeon's office for instructions on when/if they will need to be stopped prior to procedure.   Bring in picture ID and insurance card, advanced directive copies if applicable, CPAP/BIPAP/Inhalers if indicated morning of procedure, leave any other valuables at home.  Ensure you have arranged for someone to drive you home the day of your procedure and someone to care for you at home afterwards. It is recommended that you do not drive, drink alcohol, or make any major legal decisions for at least 24 hours after your procedure is complete.    Instructions given on hospital entrance and registration location.

## 2024-02-15 ENCOUNTER — LAB (OUTPATIENT)
Dept: LAB | Facility: HOSPITAL | Age: 44
End: 2024-02-15
Payer: COMMERCIAL

## 2024-02-15 DIAGNOSIS — R93.5 ABNORMAL ULTRASOUND OF ENDOMETRIUM: ICD-10-CM

## 2024-02-15 LAB — B-HCG UR QL: NEGATIVE

## 2024-02-15 PROCEDURE — 81003 URINALYSIS AUTO W/O SCOPE: CPT

## 2024-02-15 PROCEDURE — 81025 URINE PREGNANCY TEST: CPT

## 2024-02-16 ENCOUNTER — TELEPHONE (OUTPATIENT)
Dept: OBSTETRICS AND GYNECOLOGY | Facility: CLINIC | Age: 44
End: 2024-02-16
Payer: COMMERCIAL

## 2024-02-16 LAB
BILIRUB UR QL STRIP: NEGATIVE
CLARITY UR: ABNORMAL
COLOR UR: YELLOW
GLUCOSE UR STRIP-MCNC: NEGATIVE MG/DL
HGB UR QL STRIP.AUTO: NEGATIVE
HOLD SPECIMEN: NORMAL
KETONES UR QL STRIP: NEGATIVE
LEUKOCYTE ESTERASE UR QL STRIP.AUTO: NEGATIVE
NITRITE UR QL STRIP: NEGATIVE
PH UR STRIP.AUTO: 5.5 [PH] (ref 5–8)
PROT UR QL STRIP: NEGATIVE
SP GR UR STRIP: 1.02 (ref 1–1.03)
UROBILINOGEN UR QL STRIP: ABNORMAL

## 2024-02-19 ENCOUNTER — HOSPITAL ENCOUNTER (OUTPATIENT)
Facility: HOSPITAL | Age: 44
Setting detail: HOSPITAL OUTPATIENT SURGERY
Discharge: HOME | End: 2024-02-19
Attending: OBSTETRICS & GYNECOLOGY | Admitting: OBSTETRICS & GYNECOLOGY
Payer: COMMERCIAL

## 2024-02-19 ENCOUNTER — ANESTHESIA (OUTPATIENT)
Dept: PERIOP | Facility: HOSPITAL | Age: 44
End: 2024-02-19
Payer: COMMERCIAL

## 2024-02-19 ENCOUNTER — ANESTHESIA EVENT (OUTPATIENT)
Dept: PERIOP | Facility: HOSPITAL | Age: 44
End: 2024-02-19
Payer: COMMERCIAL

## 2024-02-19 VITALS
HEART RATE: 86 BPM | DIASTOLIC BLOOD PRESSURE: 87 MMHG | OXYGEN SATURATION: 97 % | RESPIRATION RATE: 16 BRPM | TEMPERATURE: 98 F | SYSTOLIC BLOOD PRESSURE: 124 MMHG

## 2024-02-19 DIAGNOSIS — R93.5 ABNORMAL ULTRASOUND OF ENDOMETRIUM: ICD-10-CM

## 2024-02-19 PROCEDURE — 58558 HYSTEROSCOPY BIOPSY: CPT | Performed by: OBSTETRICS & GYNECOLOGY

## 2024-02-19 PROCEDURE — 25010000002 FENTANYL CITRATE (PF) 50 MCG/ML SOLUTION PREFILLED SYRINGE: Performed by: NURSE ANESTHETIST, CERTIFIED REGISTERED

## 2024-02-19 PROCEDURE — 25010000002 ONDANSETRON PER 1 MG: Performed by: NURSE ANESTHETIST, CERTIFIED REGISTERED

## 2024-02-19 PROCEDURE — 25010000002 PROPOFOL 10 MG/ML EMULSION: Performed by: NURSE ANESTHETIST, CERTIFIED REGISTERED

## 2024-02-19 PROCEDURE — 25010000002 MIDAZOLAM PER 1MG: Performed by: NURSE ANESTHETIST, CERTIFIED REGISTERED

## 2024-02-19 PROCEDURE — 25010000002 KETOROLAC TROMETHAMINE PER 15 MG: Performed by: NURSE ANESTHETIST, CERTIFIED REGISTERED

## 2024-02-19 PROCEDURE — 25010000002 LIDOCAINE 1 % SOLUTION: Performed by: OBSTETRICS & GYNECOLOGY

## 2024-02-19 PROCEDURE — S0260 H&P FOR SURGERY: HCPCS | Performed by: OBSTETRICS & GYNECOLOGY

## 2024-02-19 PROCEDURE — 25810000003 LACTATED RINGERS PER 1000 ML: Performed by: OBSTETRICS & GYNECOLOGY

## 2024-02-19 RX ORDER — MIDAZOLAM HYDROCHLORIDE 2 MG/2ML
INJECTION, SOLUTION INTRAMUSCULAR; INTRAVENOUS AS NEEDED
Status: DISCONTINUED | OUTPATIENT
Start: 2024-02-19 | End: 2024-02-19 | Stop reason: SURG

## 2024-02-19 RX ORDER — PROPOFOL 10 MG/ML
VIAL (ML) INTRAVENOUS CONTINUOUS PRN
Status: DISCONTINUED | OUTPATIENT
Start: 2024-02-19 | End: 2024-02-19 | Stop reason: SURG

## 2024-02-19 RX ORDER — ONDANSETRON HYDROCHLORIDE 8 MG/1
8 TABLET, FILM COATED ORAL EVERY 8 HOURS PRN
Qty: 15 TABLET | Refills: 0 | Status: SHIPPED | OUTPATIENT
Start: 2024-02-19

## 2024-02-19 RX ORDER — LIDOCAINE HYDROCHLORIDE 10 MG/ML
INJECTION, SOLUTION INFILTRATION; PERINEURAL AS NEEDED
Status: DISCONTINUED | OUTPATIENT
Start: 2024-02-19 | End: 2024-02-19 | Stop reason: HOSPADM

## 2024-02-19 RX ORDER — PROMETHAZINE HYDROCHLORIDE 25 MG/1
12.5 TABLET ORAL ONCE AS NEEDED
Status: DISCONTINUED | OUTPATIENT
Start: 2024-02-19 | End: 2024-02-19 | Stop reason: HOSPADM

## 2024-02-19 RX ORDER — ONDANSETRON 2 MG/ML
INJECTION INTRAMUSCULAR; INTRAVENOUS AS NEEDED
Status: DISCONTINUED | OUTPATIENT
Start: 2024-02-19 | End: 2024-02-19 | Stop reason: SURG

## 2024-02-19 RX ORDER — SODIUM CHLORIDE 0.9 % (FLUSH) 0.9 %
3 SYRINGE (ML) INJECTION EVERY 12 HOURS SCHEDULED
Status: DISCONTINUED | OUTPATIENT
Start: 2024-02-19 | End: 2024-02-19 | Stop reason: HOSPADM

## 2024-02-19 RX ORDER — SODIUM CHLORIDE 0.9 % (FLUSH) 0.9 %
10 SYRINGE (ML) INJECTION AS NEEDED
Status: DISCONTINUED | OUTPATIENT
Start: 2024-02-19 | End: 2024-02-19 | Stop reason: HOSPADM

## 2024-02-19 RX ORDER — SODIUM CHLORIDE 9 MG/ML
40 INJECTION, SOLUTION INTRAVENOUS AS NEEDED
Status: DISCONTINUED | OUTPATIENT
Start: 2024-02-19 | End: 2024-02-19 | Stop reason: HOSPADM

## 2024-02-19 RX ORDER — FENTANYL CITRATE 50 UG/ML
INJECTION, SOLUTION INTRAMUSCULAR; INTRAVENOUS AS NEEDED
Status: DISCONTINUED | OUTPATIENT
Start: 2024-02-19 | End: 2024-02-19 | Stop reason: SURG

## 2024-02-19 RX ORDER — ONDANSETRON 4 MG/1
4 TABLET, ORALLY DISINTEGRATING ORAL ONCE AS NEEDED
Status: DISCONTINUED | OUTPATIENT
Start: 2024-02-19 | End: 2024-02-19 | Stop reason: HOSPADM

## 2024-02-19 RX ORDER — HYDROCODONE BITARTRATE AND ACETAMINOPHEN 5; 325 MG/1; MG/1
1 TABLET ORAL EVERY 6 HOURS PRN
Qty: 12 TABLET | Refills: 0 | Status: SHIPPED | OUTPATIENT
Start: 2024-02-19

## 2024-02-19 RX ORDER — IBUPROFEN 600 MG/1
600 TABLET ORAL EVERY 6 HOURS PRN
Qty: 60 TABLET | Refills: 0 | Status: SHIPPED | OUTPATIENT
Start: 2024-02-19

## 2024-02-19 RX ORDER — MAGNESIUM HYDROXIDE 1200 MG/15ML
LIQUID ORAL AS NEEDED
Status: DISCONTINUED | OUTPATIENT
Start: 2024-02-19 | End: 2024-02-19 | Stop reason: HOSPADM

## 2024-02-19 RX ORDER — ONDANSETRON 2 MG/ML
4 INJECTION INTRAMUSCULAR; INTRAVENOUS ONCE AS NEEDED
Status: DISCONTINUED | OUTPATIENT
Start: 2024-02-19 | End: 2024-02-19 | Stop reason: HOSPADM

## 2024-02-19 RX ORDER — KETOROLAC TROMETHAMINE 30 MG/ML
INJECTION, SOLUTION INTRAMUSCULAR; INTRAVENOUS AS NEEDED
Status: DISCONTINUED | OUTPATIENT
Start: 2024-02-19 | End: 2024-02-19 | Stop reason: SURG

## 2024-02-19 RX ORDER — SODIUM CHLORIDE, SODIUM LACTATE, POTASSIUM CHLORIDE, CALCIUM CHLORIDE 600; 310; 30; 20 MG/100ML; MG/100ML; MG/100ML; MG/100ML
1000 INJECTION, SOLUTION INTRAVENOUS CONTINUOUS
Status: DISCONTINUED | OUTPATIENT
Start: 2024-02-19 | End: 2024-02-19 | Stop reason: HOSPADM

## 2024-02-19 RX ADMIN — FENTANYL CITRATE 50 MCG: 50 INJECTION, SOLUTION INTRAMUSCULAR; INTRAVENOUS at 09:51

## 2024-02-19 RX ADMIN — PROPOFOL 200 MCG/KG/MIN: 10 INJECTION, EMULSION INTRAVENOUS at 09:51

## 2024-02-19 RX ADMIN — KETOROLAC TROMETHAMINE 30 MG: 30 INJECTION, SOLUTION INTRAMUSCULAR; INTRAVENOUS at 10:08

## 2024-02-19 RX ADMIN — MIDAZOLAM HYDROCHLORIDE 2 MG: 1 INJECTION, SOLUTION INTRAMUSCULAR; INTRAVENOUS at 09:47

## 2024-02-19 RX ADMIN — SODIUM CHLORIDE, POTASSIUM CHLORIDE, SODIUM LACTATE AND CALCIUM CHLORIDE 1000 ML: 600; 310; 30; 20 INJECTION, SOLUTION INTRAVENOUS at 09:22

## 2024-02-19 RX ADMIN — LIDOCAINE HYDROCHLORIDE 60 MG: 20 INJECTION, SOLUTION INTRAVENOUS at 09:51

## 2024-02-19 RX ADMIN — ONDANSETRON 4 MG: 2 INJECTION INTRAMUSCULAR; INTRAVENOUS at 09:47

## 2024-02-19 NOTE — ANESTHESIA PREPROCEDURE EVALUATION
Anesthesia Evaluation     Patient summary reviewed and Nursing notes reviewed   NPO Solid Status: > 8 hours  NPO Liquid Status: > 8 hours           Airway   Mallampati: I  TM distance: >3 FB  Neck ROM: full  No difficulty expected  Dental - normal exam     Pulmonary - negative pulmonary ROS and normal exam   Cardiovascular - negative cardio ROS and normal exam  Exercise tolerance: good (4-7 METS)        Neuro/Psych  (+) seizures, headaches, psychiatric history Anxiety  GI/Hepatic/Renal/Endo    (-) no renal disease    Musculoskeletal (-) negative ROS    Abdominal  - normal exam    Bowel sounds: normal.   Substance History - negative use     OB/GYN negative ob/gyn ROS         Other                      Anesthesia Plan    ASA 2     MAC     (Risks and benefits discussed including risk of aspiration, recall and dental damage. All patient questions answered.    Will continue with plan of care.)  intravenous induction     Anesthetic plan, risks, benefits, and alternatives have been provided, discussed and informed consent has been obtained with: patient.  Pre-procedure education provided  Plan discussed with CRNA.

## 2024-02-19 NOTE — H&P
[facility]  Torie Rg  : 1980  MRN: [id number]  CSN: [id number]    History and Physical  Subjective   Torie Rg is a 43 y.o. year old  who presents for surgery due to menorrhagia with irregular cycles as well as postcoital bleeding.  Patient with known history of large endocervical polyp in the past.  Patient had improvement in her symptoms after removal of the polyp.  She reports again having similar symptoms.  A transvaginal ultrasound was obtained in the office.  Patient was noted to have an abnormal endometrium with a questionable focal abnormality in the lower uterine segment.  She is here for further evaluation with D&C and diagnostic hysteroscopy.    History  Past Medical History:   Diagnosis Date    Abnormal Pap smear of cervix     ATYPICAL, REPEAT NORMAL, NO FURTHER TREATMENT REQUIRED    Allergic     Vancomycin    Anxiety     Broken arm     Broken foot     Headache     Migraines - occasionally    Kidney infection 2016    Migraine     Ovarian cyst     Bilateral cysts     Polycystic ovary syndrome     Pulled muscle     neck left shoulder - prescribed prednisone & robaxin 24    Seizures     last seizure approx      No current facility-administered medications on file prior to encounter.     Current Outpatient Medications on File Prior to Encounter   Medication Sig Dispense Refill    Diclofenac Sodium (VOLTAREN) 1 % gel gel Apply 4 g topically to the appropriate area as directed 4 (Four) Times a Day As Needed (pain). 100 g 0     Allergies   Allergen Reactions    Vancomycin Shortness Of Breath     Past Surgical History:   Procedure Laterality Date    ARM HARDWARE REMOVAL Right     BARTHOLIN GLAND CYST EXCISION  2016    FRACTURE SURGERY      ORIF ANKLE FRACTURE      ORIF ULNA/RADIUS FRACTURES Right     ORIF WRIST FRACTURE Right 2020    Procedure: Right distal radius closed reduction and pinning;  Surgeon: Ron Gunn MD;   Location: [facility] OR;  Service: Orthopedics    WISDOM TOOTH EXTRACTION       Family History   Problem Relation Age of Onset    Cancer Mother         Skin    Thyroid disease Mother     Melanoma Mother     Hypertension Father     Hyperlipidemia Father     Migraines Father     Stroke Father     Cancer Father         Skin    Kidney disease Father     Breast cancer Paternal Aunt     Breast cancer Cousin     Ovarian cancer Maternal Aunt      Social History     Socioeconomic History    Marital status:    Tobacco Use    Smoking status: Former     Packs/day: 0.50     Years: 10.00     Additional pack years: 0.00     Total pack years: 5.00     Types: Cigarettes     Quit date:      Years since quittin.1    Smokeless tobacco: Never    Tobacco comments:     Quit >15 years ago    Substance and Sexual Activity    Alcohol use: Yes     Comment: rarely    Drug use: No    Sexual activity: Yes     Partners: Male     Birth control/protection: Condom     Review of Systems  The following systems were reviewed and negative:  constitution, eyes, ENT, respiratory, cardiovascular, gastrointestinal, genitourinary, integument, breast, hematologic / lymphatic, musculoskeletal, neurological, behavioral/psych, endocrine, and allergies / immunologic    Objective  Recent Vitals         2023       BP: -- 142/95 124/72     Pulse: 79 144 --     Temp: -- 97.5 °F (36.4 °C) --     Weight: 97.1 kg (214 lb) 90.7 kg (200 lb) 88.9 kg (196 lb)     BMI (Calculated): 31.6 29.5 28.9           Physical Exam:  General Appearance:  Alert and cooperative, not in any acute distress.   Head:  Atraumatic and normocephalic, without obvious abnormality.   Eyes:          PERRLA, conjunctivae and sclerae normal, no Icterus. No pallor. Extraocular movements are within normal limits.   Ears:  Ears appear intact with no abnormalities noted.   Throat: No oral lesions, no thrush, oral mucosa moist.   Neck: Supple, trachea midline, no  thyromegaly, no carotid bruit.   Back:   No kyphoscoliosis present. No tenderness to palpation,   range of motion normal.  No CVAT.   Lungs:   Chest shape is normal. Breath sounds heard bilaterally equally.  No crackles or wheezing. No Pleural rub or bronchial breathing. Normal respiratory effort.   Heart:  Normal S1 and S2, no murmur, no gallop, no rub. No JVD.   Abdomen:   Normal bowel sounds, no masses, no hepatomegaly, no splenomegaly. Soft, non-tender, no guarding, no rebound tenderness.   Extremities: Moves all extremities well, no edema, no cyanosis, no clubbing.  Normal range of motion. Normal strength and tone.   Skin: No bleeding, bruising or rash. No palpable masses noted or induration.   Psychiatric: Alert and oriented  to time, place, and person. Appropriate mood and affect. Thought content organized and appropriate judgment.       Recent Labs  Lab Results (last 7 days)       ** No results found for the last 168 hours. **             Recent Imaging  US Non-ob Transvaginal    Result Date: 2024  Torie Rg : 1980 MRN: [id number] Date: 2024 Reason for exam/History: Menorrhagia, history of endometrial polyps The ultrasound images are reviewed.  The uterus is anteverted in position.  The uterus appears normal.  The endometrium is heterogeneous with a questionable defect in the lower uterine segment/endocervix.  The endometrium measures 10.64 mms.  The bilateral ovaries are normal in appearance.  There are small follicles seen bilaterally.  There is normal vascular flow noted.  There is no free fluid noted. The exam limitations noted:  none See ultrasound report for measurements and structures identified. Brenda Ruiz MD, [facility]   Menorrhagia with irregular cycles  Postcoital bleeding  History of endocervical polyp  Abnormal endometrium on ultrasound     Plan   D&C with diagnostic hysteroscopy.  ABx and DVT prophylaxis as  indicated.  Risks, complications, benefits, and other alternatives discussed.  All questions answered and pt in agreement with plan.    Brenda Ruiz M.D.  2/19/2024  06:14 EST

## 2024-02-19 NOTE — OP NOTE
Jose Rg  : 1980  MRN: [id number]  CSN: [id number]  Date: 2024    Operative Report    Pre-op Diagnosis:  Abnormal ultrasound of endometrium [R93.5]  Menorrhagia with irregular cycles  Postcoital bleeding  History of endocervical polyp     Post-op Diagnosis:  Post-Op Diagnosis Codes:     * Abnormal ultrasound of endometrium [R93.5]        Same     Procedure: Procedure(s):  DILATATION AND CURETTAGE HYSTEROSCOPY WITH POLYPECTOMY     Surgeon: Brenda Ruiz M.D.     Assist: Staff was responsible for performing the following activities: Retraction and Suction and their skilled assistance was necessary for the success of this case.     OR Staff: Circulator: Vanesa Mujica RN; Briseida Vegas RN  Scrub Person: Ana Trejo PCT     Anesthesia: IV sedation with 1% lidocaine paracervical block     Estimated Blood Loss: 10 mls     Specimens:  Endometrial     Findings: Prolapsed endocervical polyp noted at the 3 o'clock position.  Marked amount of tissue seen in the endometrium and retrieved.     Complications: none       Description of Procedure:  After the appropriate time out and adequate dosing of her anesthesia, the patient had been prepped and draped in the usual sterile fashion.  She was placed in the dorsal lithotomy position using Praveen stirrups.  The bladder had been drained with a red rubber catheter per nursing.  A weighted speculum was placed in the vagina.  The anterior lip of the cervix was grasped with a single-tooth tenaculum.  The cervix was injected at the 3 o'clock and 9 o'clock position with 1% lidocaine plain without any complications.  Using a polyp forcep the polyp was removed.  The cervix was then progressively dilated using Hernandes dilators.  Rigid hysteroscopy was then performed with the above findings noted.  Sharp curettings were then obtained with a good cry throughout with tissue retrieved and sent for pathologic specimen.  The cervical tenaculum was removed  and the cervix was noted to be hemostatic after the application of 2-0 chromic suture in a figure-of-eight fashion.  All instrument and sponge counts were correct at the end of the procedure.  The patient tolerated the procedure well.  There were no complications.  She was taken to the postoperative recovery room in stable condition.    Brenda Ruiz M.D.  2/19/2024  10:18 EST

## 2024-02-19 NOTE — ANESTHESIA POSTPROCEDURE EVALUATION
Patient: Torie Rg    Procedure Summary       Date: 02/19/24 Room / Location: [facility] OR  / [facility] OR    Anesthesia Start: 0947 Anesthesia Stop: 1010    Procedure: DILATATION AND CURETTAGE HYSTEROSCOPY (Uterus) Diagnosis:       Abnormal ultrasound of endometrium      (Abnormal ultrasound of endometrium [R93.5])    Surgeons: Brenda Ruiz MD Provider: Rylan Walters CRNA    Anesthesia Type: MAC ASA Status: 2            Anesthesia Type: MAC    Vitals  No vitals data found for the desired time range.          Post Anesthesia Care and Evaluation    Patient location during evaluation: PHASE II  Patient participation: complete - patient participated  Level of consciousness: awake and alert  Pain score: 1  Pain management: satisfactory to patient    Airway patency: patent  Anesthetic complications: No anesthetic complications  PONV Status: none  Cardiovascular status: acceptable and stable  Respiratory status: acceptable and spontaneous ventilation  Hydration status: acceptable    Comments: Vitals signs as noted in nursing documentation as per protocol.

## 2024-02-20 LAB — REF LAB TEST METHOD: NORMAL

## 2024-02-27 ENCOUNTER — OFFICE VISIT (OUTPATIENT)
Dept: OBSTETRICS AND GYNECOLOGY | Facility: CLINIC | Age: 44
End: 2024-02-27
Payer: COMMERCIAL

## 2024-02-27 VITALS
WEIGHT: 197 LBS | HEIGHT: 69 IN | SYSTOLIC BLOOD PRESSURE: 122 MMHG | DIASTOLIC BLOOD PRESSURE: 82 MMHG | BODY MASS INDEX: 29.18 KG/M2

## 2024-02-27 DIAGNOSIS — Z09 POSTOPERATIVE FOLLOW-UP: Primary | ICD-10-CM

## 2024-02-27 PROCEDURE — 1160F RVW MEDS BY RX/DR IN RCRD: CPT | Performed by: PHYSICIAN ASSISTANT

## 2024-02-27 PROCEDURE — 1159F MED LIST DOCD IN RCRD: CPT | Performed by: PHYSICIAN ASSISTANT

## 2024-02-27 PROCEDURE — 99024 POSTOP FOLLOW-UP VISIT: CPT | Performed by: PHYSICIAN ASSISTANT

## 2024-02-27 NOTE — PROGRESS NOTES
Subjective   Chief Complaint   Patient presents with    Post-op     One week post-op D&C Hysteroscopy       Torie Rg is a 43 y.o. year old  presenting to be seen for post op check.  Has done well post procedure  Normal bowel and bladder function. No vaginal bleeding  Pathology benign    Past Medical History:   Diagnosis Date    Abnormal Pap smear of cervix     ATYPICAL, REPEAT NORMAL, NO FURTHER TREATMENT REQUIRED    Allergic     Vancomycin    Anxiety     Broken arm     Broken foot     Headache     Migraines - occasionally    Kidney infection 2016    Migraine     Ovarian cyst     Bilateral cysts     Polycystic ovary syndrome     Pulled muscle     neck left shoulder - prescribed prednisone & robaxin 24    Seizures     last seizure approx         Current Outpatient Medications:     Diclofenac Sodium (VOLTAREN) 1 % gel gel, Apply 4 g topically to the appropriate area as directed 4 (Four) Times a Day As Needed (pain)., Disp: 100 g, Rfl: 0    ibuprofen (ADVIL,MOTRIN) 600 MG tablet, Take 1 tablet by mouth Every 6 (Six) Hours As Needed for Mild Pain., Disp: 60 tablet, Rfl: 0    methocarbamol (ROBAXIN) 500 MG tablet, Take 1 tablet by mouth 2 (Two) Times a Day As Needed for Muscle Spasms., Disp: 30 tablet, Rfl: 0   Allergies   Allergen Reactions    Vancomycin Shortness Of Breath      Past Surgical History:   Procedure Laterality Date    ARM HARDWARE REMOVAL Right     BARTHOLIN GLAND CYST EXCISION  2016    D & C HYSTEROSCOPY N/A 2024    Procedure: DILATATION AND CURETTAGE HYSTEROSCOPY;  Surgeon: Brenda Ruiz MD;  Location: [facility] OR;  Service: Obstetrics/Gynecology;  Laterality: N/A;    FRACTURE SURGERY      ORIF ANKLE FRACTURE      ORIF ULNA/RADIUS FRACTURES Right     ORIF WRIST FRACTURE Right 2020    Procedure: Right distal radius closed reduction and pinning;  Surgeon: Ron Gunn MD;  Location: [facility] OR;  Service: Orthopedics    WISDOM TOOTH EXTRACTION   "      Social History     Socioeconomic History    Marital status:    Tobacco Use    Smoking status: Former     Packs/day: 0.50     Years: 10.00     Additional pack years: 0.00     Total pack years: 5.00     Types: Cigarettes     Quit date:      Years since quittin.1    Smokeless tobacco: Never    Tobacco comments:     Quit >15 years ago    Vaping Use    Vaping Use: Never used   Substance and Sexual Activity    Alcohol use: Yes     Comment: rarely    Drug use: No    Sexual activity: Yes     Partners: Male     Birth control/protection: Condom      Family History   Problem Relation Age of Onset    Cancer Mother         Skin    Thyroid disease Mother     Melanoma Mother     Hypertension Father     Hyperlipidemia Father     Migraines Father     Stroke Father     Cancer Father         Skin    Kidney disease Father     Breast cancer Paternal Aunt     Breast cancer Cousin     Ovarian cancer Maternal Aunt        Review of Systems   Constitutional:  Negative for chills, diaphoresis and fever.   Gastrointestinal: Negative.    Genitourinary:  Negative for difficulty urinating, dysuria, pelvic pain and vaginal bleeding.           Objective   /82   Ht 175.3 cm (69.02\")   Wt 89.4 kg (197 lb)   LMP 2024 (Approximate)   BMI 29.07 kg/m²     Physical Exam  Constitutional:       Appearance: Normal appearance. She is well-developed and well-groomed.   Eyes:      General: Lids are normal.      Extraocular Movements: Extraocular movements intact.      Conjunctiva/sclera: Conjunctivae normal.   Neurological:      General: No focal deficit present.      Mental Status: She is alert and oriented to person, place, and time.   Psychiatric:         Attention and Perception: Attention normal.         Mood and Affect: Mood normal.         Speech: Speech normal.         Behavior: Behavior is cooperative.            Result Review :                   Assessment and Plan  Diagnoses and all orders for this visit:    1. " Postoperative follow-up (Primary)      Patient Instructions   RTO 3 months for annual/pap           This note was electronically signed.    Elaine Valencia PA-C   February 27, 2024

## 2024-04-10 ENCOUNTER — OFFICE VISIT (OUTPATIENT)
Dept: INTERNAL MEDICINE | Facility: CLINIC | Age: 44
End: 2024-04-10
Payer: COMMERCIAL

## 2024-04-10 VITALS
HEART RATE: 125 BPM | OXYGEN SATURATION: 100 % | TEMPERATURE: 97 F | BODY MASS INDEX: 29.68 KG/M2 | HEIGHT: 69 IN | WEIGHT: 200.4 LBS

## 2024-04-10 DIAGNOSIS — M54.12 CERVICAL RADICULOPATHY: Primary | ICD-10-CM

## 2024-04-10 DIAGNOSIS — M54.2 ACUTE NECK PAIN: ICD-10-CM

## 2024-04-10 PROCEDURE — 1159F MED LIST DOCD IN RCRD: CPT | Performed by: FAMILY MEDICINE

## 2024-04-10 PROCEDURE — 1160F RVW MEDS BY RX/DR IN RCRD: CPT | Performed by: FAMILY MEDICINE

## 2024-04-10 PROCEDURE — 99214 OFFICE O/P EST MOD 30 MIN: CPT | Performed by: FAMILY MEDICINE

## 2024-04-10 RX ORDER — PREDNISONE 20 MG/1
TABLET ORAL
Qty: 18 TABLET | Refills: 0 | Status: SHIPPED | OUTPATIENT
Start: 2024-04-10

## 2024-04-10 RX ORDER — CYCLOBENZAPRINE HCL 10 MG
10 TABLET ORAL 3 TIMES DAILY PRN
Qty: 60 TABLET | Refills: 1 | Status: SHIPPED | OUTPATIENT
Start: 2024-04-10

## 2024-04-10 NOTE — PROGRESS NOTES
"Chief Complaint  Neck Pain (Neck into shoulder, middle neck into left shoulder, started a few months back, has been seen a few times due to the issues) and Spasms (In left arm, started a few months back, has gotten better as fr as numbness goes but the spasms she is now taking a family member medication to help, possibly flexeril )  Answers submitted by the patient for this visit:  Primary Reason for Visit (Submitted on 4/10/2024)  What is the primary reason for your visit?: Back Pain    Subjective        Torie Lawanda Rg presents to [facility]  History of Present Illness  Muscle issue started in January  Seemed to start after carrying heavy luggage, did lot of walking in [place]  Had no problems in [place] other than occasional neck, shoulder discomfort, nothing that \"put me down\"  When she got back felt like a crick in her neck but then worsened gradually.  Has tried steroid, cream, antiinflammatories, muscle relaxers and helped for a bit but then flared with laundry.  [place] did some meds which helped for a while  Had to do some unpacking of a home and flared back up.   Has been doing physical therapy for this issue.   Does not feel PT has helped at all.     Some metal in arm and ankle.   Back Pain  This is a chronic problem. The current episode started more than 1 year ago. The problem occurs constantly. The problem has been coming and going since onset. The pain is present in the thoracic spine. The quality of the pain is described as burning and cramping. The pain is at a severity of 7/10. The symptoms are aggravated by bending and position. Associated symptoms include numbness. Pertinent negatives include no abdominal pain, bladder incontinence, bowel incontinence, chest pain, dysuria, fever, leg pain, pelvic pain, perianal numbness or weight loss. Risk factors include obesity.   Additional comments: I've had neck and back pain for about 3 months, right now my major issue is I'm " "having very bad muscle cramps/spasms in my back and down my arm.      Objective   Vital Signs:  Pulse (!) 125   Temp 97 °F (36.1 °C) (Temporal)   Ht 175.3 cm (69\")   Wt 90.9 kg (200 lb 6.4 oz)   SpO2 100%   BMI 29.59 kg/m²   Estimated body mass index is 29.59 kg/m² as calculated from the following:    Height as of this encounter: 175.3 cm (69\").    Weight as of this encounter: 90.9 kg (200 lb 6.4 oz).               Physical Exam  Vitals and nursing note reviewed.   Constitutional:       General: She is not in acute distress.     Appearance: Normal appearance. She is well-developed and well-groomed. She is not ill-appearing, toxic-appearing or diaphoretic.      Comments: Appears uncomfortable.    HENT:      Head: Normocephalic and atraumatic.      Right Ear: Hearing normal.      Left Ear: Hearing normal.   Eyes:      General: Lids are normal. No scleral icterus.     Extraocular Movements: Extraocular movements intact.   Neck:      Trachea: Phonation normal.   Pulmonary:      Effort: Pulmonary effort is normal.   Musculoskeletal:      Cervical back: Neck supple. Spasms present. No torticollis or bony tenderness.   Skin:     Coloration: Skin is not jaundiced or pale.   Neurological:      General: No focal deficit present.      Mental Status: She is alert and oriented to person, place, and time.      Motor: Motor function is intact.   Psychiatric:         Attention and Perception: Attention and perception normal.         Mood and Affect: Mood and affect normal.         Speech: Speech normal.         Behavior: Behavior normal. Behavior is cooperative.         Thought Content: Thought content normal.         Cognition and Memory: Cognition and memory normal.         Judgment: Judgment normal.        Result Review :    The following data was reviewed by: Rosalina John MD on 04/10/2024:  XR Spine Cervical 3 View (01/26/2024 16:31)                Assessment and Plan     Diagnoses and all orders for this " visit:    1. Cervical radiculopathy (Primary)  -     MRI Cervical Spine Without Contrast; Future  -     predniSONE (DELTASONE) 20 MG tablet; 3 TAB PO QDAY X 3 DAYS, 2 TAB PO QDAY X 3 DAYS, 1 TAB PO QDAY X 2 DAYS, 1/2 TAB PO QDAY X 2 DAYS.  Dispense: 18 tablet; Refill: 0  -     cyclobenzaprine (FLEXERIL) 10 MG tablet; Take 1 tablet by mouth 3 (Three) Times a Day As Needed for Muscle Spasms.  Dispense: 60 tablet; Refill: 1    2. Acute neck pain  -     Diclofenac Sodium (VOLTAREN) 1 % gel gel; Apply 4 g topically to the appropriate area as directed 4 (Four) Times a Day As Needed (pain).  Dispense: 350 g; Refill: 1      Has failed conservative measures, physical therapy. Warrants further evaluation with mri. If insurance fails to approve will refer to KATHIE Mckeon.          Follow Up     Return for As Needed.  Patient was given instructions and counseling regarding her condition or for health maintenance advice. Please see specific information pulled into the AVS if appropriate.

## 2024-06-05 ENCOUNTER — OFFICE VISIT (OUTPATIENT)
Dept: OBSTETRICS AND GYNECOLOGY | Facility: CLINIC | Age: 44
End: 2024-06-05
Payer: COMMERCIAL

## 2024-06-05 VITALS
HEIGHT: 69 IN | SYSTOLIC BLOOD PRESSURE: 124 MMHG | BODY MASS INDEX: 29.77 KG/M2 | DIASTOLIC BLOOD PRESSURE: 80 MMHG | WEIGHT: 201 LBS

## 2024-06-05 DIAGNOSIS — Z12.4 SCREENING FOR CERVICAL CANCER: ICD-10-CM

## 2024-06-05 DIAGNOSIS — Z01.419 WELL WOMAN EXAM WITH ROUTINE GYNECOLOGICAL EXAM: Primary | ICD-10-CM

## 2024-06-05 NOTE — PROGRESS NOTES
Subjective   Chief Complaint   Patient presents with    Gynecologic Exam     Last pap done 3/16/17-WNL, MMG done 23       Torie Rg is a 43 y.o. year old  presenting to be seen for her annual gynecological exam.   Has no complaints or concerns  ..Patient's last menstrual period was 2024 (approximate).   Has screening mammogram schedule in July    Past Medical History:   Diagnosis Date    Abnormal Pap smear of cervix     ATYPICAL, REPEAT NORMAL, NO FURTHER TREATMENT REQUIRED    Allergic     Vancomycin    Anxiety     Broken arm     Broken foot     Headache     Migraines - occasionally    Kidney infection 2016    Migraine     Ovarian cyst     Bilateral cysts     Polycystic ovary syndrome     Pulled muscle     neck left shoulder - prescribed prednisone & robaxin 24    Seizures     last seizure approx         Current Outpatient Medications:     predniSONE (DELTASONE) 20 MG tablet, 3 TAB PO QDAY X 3 DAYS, 2 TAB PO QDAY X 3 DAYS, 1 TAB PO QDAY X 2 DAYS, 1/2 TAB PO QDAY X 2 DAYS., Disp: 18 tablet, Rfl: 0   Allergies   Allergen Reactions    Vancomycin Shortness Of Breath      Past Surgical History:   Procedure Laterality Date    ARM HARDWARE REMOVAL Right     BARTHOLIN GLAND CYST EXCISION  2016    D & C HYSTEROSCOPY N/A 2024    Procedure: DILATATION AND CURETTAGE HYSTEROSCOPY;  Surgeon: Brenda Ruiz MD;  Location: [facility];  Service: Obstetrics/Gynecology;  Laterality: N/A;    FRACTURE SURGERY      ORIF ANKLE FRACTURE      ORIF ULNA/RADIUS FRACTURES Right     ORIF WRIST FRACTURE Right 2020    Procedure: Right distal radius closed reduction and pinning;  Surgeon: Ron Gunn MD;  Location: [facility];  Service: Orthopedics    WISDOM TOOTH EXTRACTION        Social History     Socioeconomic History    Marital status:    Tobacco Use    Smoking status: Former     Current packs/day: 0.00     Average packs/day: 0.5 packs/day for 10.0 years (5.0  "ttl pk-yrs)     Types: Cigarettes     Start date:      Quit date:      Years since quittin.4    Smokeless tobacco: Never    Tobacco comments:     Quit >15 years ago    Vaping Use    Vaping status: Never Used   Substance and Sexual Activity    Alcohol use: Yes     Comment: rarely    Drug use: No    Sexual activity: Yes     Partners: Male     Birth control/protection: Condom      Family History   Problem Relation Age of Onset    Cancer Mother         Skin    Thyroid disease Mother     Melanoma Mother     Hypertension Father     Hyperlipidemia Father     Migraines Father     Stroke Father     Cancer Father         Skin    Kidney disease Father     Breast cancer Paternal Aunt     Breast cancer Cousin     Ovarian cancer Maternal Aunt        Review of Systems   Constitutional: Negative.    Gastrointestinal: Negative.    Genitourinary: Negative.            Objective   /80   Ht 175.3 cm (69\")   Wt 91.2 kg (201 lb)   LMP 2024 (Approximate)   BMI 29.68 kg/m²     Physical Exam  Exam conducted with a chaperone present.   Constitutional:       Appearance: Normal appearance. She is well-developed and well-groomed.   Eyes:      General: Lids are normal.      Extraocular Movements: Extraocular movements intact.      Conjunctiva/sclera: Conjunctivae normal.   Neck:      Thyroid: No thyroid mass.   Chest:   Breasts:     Breasts are symmetrical.      Right: No inverted nipple, mass, nipple discharge, skin change or tenderness.      Left: No inverted nipple, mass, nipple discharge, skin change or tenderness.   Abdominal:      General: There is no distension.      Palpations: Abdomen is soft. There is no hepatomegaly or splenomegaly.      Tenderness: There is no abdominal tenderness.   Genitourinary:     Exam position: Lithotomy position.      Labia:         Right: No rash, tenderness or lesion.         Left: No rash, tenderness or lesion.       Urethra: No prolapse, urethral pain, urethral swelling or " urethral lesion.      Vagina: No vaginal discharge, tenderness or lesions.      Cervix: No cervical motion tenderness, discharge, friability or lesion.      Uterus: Not enlarged and not tender.       Adnexa:         Right: No mass or tenderness.          Left: No mass or tenderness.     Musculoskeletal:      Cervical back: Neck supple.   Lymphadenopathy:      Upper Body:      Right upper body: No axillary adenopathy.      Left upper body: No axillary adenopathy.   Skin:     General: Skin is warm and dry.      Findings: No lesion.   Neurological:      General: No focal deficit present.      Mental Status: She is alert and oriented to person, place, and time.   Psychiatric:         Attention and Perception: Attention normal.         Mood and Affect: Mood normal.         Speech: Speech normal.         Behavior: Behavior normal. Behavior is cooperative.         Thought Content: Thought content normal.            Result Review :                   Assessment and Plan  Diagnoses and all orders for this visit:    1. Well woman exam with routine gynecological exam (Primary)    2. Screening for cervical cancer  -     LIQUID-BASED PAP SMEAR WITH HPV GENOTYPING REGARDLESS OF INTERPRETATION ([facility])      Patient Instructions   Self breast exam monthly  Regular exercise             This note was electronically signed.    Elaine Valencia PA-C   June 5, 2024

## 2024-06-10 LAB — REF LAB TEST METHOD: NORMAL

## 2024-07-24 ENCOUNTER — HOSPITAL ENCOUNTER (OUTPATIENT)
Dept: MAMMOGRAPHY | Facility: HOSPITAL | Age: 44
Discharge: HOME | End: 2024-07-24
Admitting: OBSTETRICS & GYNECOLOGY
Payer: COMMERCIAL

## 2024-07-24 DIAGNOSIS — Z12.31 ENCOUNTER FOR SCREENING MAMMOGRAM FOR BREAST CANCER: ICD-10-CM

## 2024-07-24 PROCEDURE — 77063 BREAST TOMOSYNTHESIS BI: CPT

## 2024-07-24 PROCEDURE — 77067 SCR MAMMO BI INCL CAD: CPT

## (undated) DEVICE — UNDERCAST PADDING: Brand: DEROYAL

## (undated) DEVICE — BALL PIN JURGAN .062X3/8 DIA

## (undated) DEVICE — SUT GUT CHRM 2/0 SH 27IN G123H

## (undated) DEVICE — SOL NACL 0.9PCT 1000ML

## (undated) DEVICE — GLV SURG SENSICARE ORTHO PF LF 8 STRL

## (undated) DEVICE — SUT VIC 2/0 CT1 27IN J259H

## (undated) DEVICE — ST IRR CYSTO W/SPK 77IN LF

## (undated) DEVICE — CLAVICLE STRAP: Brand: DEROYAL

## (undated) DEVICE — SPNG GZ WOVN 4X4IN 12PLY 10/BX STRL

## (undated) DEVICE — FLEXIBLE YANKAUER,MEDIUM TIP, NO VACUUM CONTROL: Brand: ARGYLE

## (undated) DEVICE — PK EXTRM UPPR 20

## (undated) DEVICE — SOL IRR H2O BTL 1000ML STRL

## (undated) DEVICE — DRSNG GZ PETROLTM XEROFORM CURAD 1X8IN STRL

## (undated) DEVICE — GLV SURG SENSICARE W/ALOE PF LF 8 STRL

## (undated) DEVICE — ADHS LIQ MASTISOL 2/3ML

## (undated) DEVICE — GLV SURG BIOGEL M LTX PF 6 1/2

## (undated) DEVICE — PK MINOR LITHO 20